# Patient Record
Sex: FEMALE | Race: WHITE | Employment: FULL TIME | URBAN - METROPOLITAN AREA
[De-identification: names, ages, dates, MRNs, and addresses within clinical notes are randomized per-mention and may not be internally consistent; named-entity substitution may affect disease eponyms.]

---

## 2024-01-30 ENCOUNTER — APPOINTMENT (OUTPATIENT)
Dept: GENERAL RADIOLOGY | Age: 39
End: 2024-01-30
Payer: COMMERCIAL

## 2024-01-30 ENCOUNTER — HOSPITAL ENCOUNTER (EMERGENCY)
Age: 39
Discharge: HOME | End: 2024-01-30
Payer: COMMERCIAL

## 2024-01-30 ENCOUNTER — OFFICE VISIT (OUTPATIENT)
Dept: ORTHOPEDIC SURGERY | Age: 39
End: 2024-01-30
Payer: COMMERCIAL

## 2024-01-30 VITALS
BODY MASS INDEX: 20.83 KG/M2 | HEIGHT: 64 IN | HEART RATE: 62 BPM | WEIGHT: 122 LBS | OXYGEN SATURATION: 100 % | TEMPERATURE: 98.1 F | DIASTOLIC BLOOD PRESSURE: 64 MMHG | SYSTOLIC BLOOD PRESSURE: 107 MMHG | RESPIRATION RATE: 18 BRPM

## 2024-01-30 DIAGNOSIS — S52.612A CLOSED DISPLACED FRACTURE OF STYLOID PROCESS OF LEFT ULNA, INITIAL ENCOUNTER: ICD-10-CM

## 2024-01-30 DIAGNOSIS — S62.102A CLOSED FRACTURE OF LEFT WRIST, INITIAL ENCOUNTER: Primary | ICD-10-CM

## 2024-01-30 DIAGNOSIS — S52.552A CLOSED EXTRAARTICULAR FRACTURE OF DISTAL RADIUS, LEFT, INITIAL ENCOUNTER: ICD-10-CM

## 2024-01-30 DIAGNOSIS — G56.02 LEFT CARPAL TUNNEL SYNDROME: ICD-10-CM

## 2024-01-30 DIAGNOSIS — S52.502A CLOSED FRACTURE OF DISTAL END OF LEFT RADIUS, UNSPECIFIED FRACTURE MORPHOLOGY, INITIAL ENCOUNTER: Primary | ICD-10-CM

## 2024-01-30 DIAGNOSIS — S52.552A OTHER EXTRAARTICULAR FRACTURE OF LOWER END OF LEFT RADIUS, INITIAL ENCOUNTER FOR CLOSED FRACTURE: ICD-10-CM

## 2024-01-30 PROCEDURE — 6360000002 HC RX W HCPCS

## 2024-01-30 PROCEDURE — 99204 OFFICE O/P NEW MOD 45 MIN: CPT | Performed by: NURSE PRACTITIONER

## 2024-01-30 PROCEDURE — 73110 X-RAY EXAM OF WRIST: CPT

## 2024-01-30 PROCEDURE — M5030 MISC ARM SLING: HCPCS | Performed by: NURSE PRACTITIONER

## 2024-01-30 PROCEDURE — 6370000000 HC RX 637 (ALT 250 FOR IP)

## 2024-01-30 PROCEDURE — 25605 CLTX DST RDL FX/EPHYS SEP W/: CPT

## 2024-01-30 PROCEDURE — 99284 EMERGENCY DEPT VISIT MOD MDM: CPT

## 2024-01-30 PROCEDURE — 96375 TX/PRO/DX INJ NEW DRUG ADDON: CPT

## 2024-01-30 PROCEDURE — 96374 THER/PROPH/DIAG INJ IV PUSH: CPT

## 2024-01-30 RX ORDER — ACETAMINOPHEN 500 MG
1000 TABLET ORAL EVERY 6 HOURS PRN
COMMUNITY

## 2024-01-30 RX ORDER — ONDANSETRON 4 MG/1
4 TABLET, FILM COATED ORAL 3 TIMES DAILY PRN
Qty: 15 TABLET | Refills: 0 | Status: SHIPPED | OUTPATIENT
Start: 2024-01-30

## 2024-01-30 RX ORDER — HYDROCODONE BITARTRATE AND ACETAMINOPHEN 5; 325 MG/1; MG/1
1 TABLET ORAL EVERY 6 HOURS PRN
Qty: 10 TABLET | Refills: 0 | Status: SHIPPED | OUTPATIENT
Start: 2024-01-30 | End: 2024-02-02

## 2024-01-30 RX ORDER — IBUPROFEN 200 MG
400 TABLET ORAL EVERY 6 HOURS PRN
COMMUNITY

## 2024-01-30 RX ORDER — ONDANSETRON 4 MG/1
4 TABLET, FILM COATED ORAL EVERY 8 HOURS PRN
Qty: 20 TABLET | Refills: 0 | Status: SHIPPED | OUTPATIENT
Start: 2024-01-30

## 2024-01-30 RX ORDER — ONDANSETRON 2 MG/ML
4 INJECTION INTRAMUSCULAR; INTRAVENOUS ONCE
Status: COMPLETED | OUTPATIENT
Start: 2024-01-30 | End: 2024-01-30

## 2024-01-30 RX ORDER — MORPHINE SULFATE 4 MG/ML
4 INJECTION, SOLUTION INTRAMUSCULAR; INTRAVENOUS
Status: COMPLETED | OUTPATIENT
Start: 2024-01-30 | End: 2024-01-30

## 2024-01-30 RX ORDER — OXYCODONE HYDROCHLORIDE 5 MG/1
5 TABLET ORAL EVERY 4 HOURS PRN
Qty: 30 TABLET | Refills: 0 | Status: SHIPPED | OUTPATIENT
Start: 2024-01-30 | End: 2024-02-04

## 2024-01-30 RX ORDER — HYDROCODONE BITARTRATE AND ACETAMINOPHEN 7.5; 325 MG/1; MG/1
1 TABLET ORAL
Status: COMPLETED | OUTPATIENT
Start: 2024-01-30 | End: 2024-01-30

## 2024-01-30 RX ADMIN — MORPHINE SULFATE 4 MG: 4 INJECTION INTRAVENOUS at 04:21

## 2024-01-30 RX ADMIN — HYDROCODONE BITARTRATE AND ACETAMINOPHEN 1 TABLET: 7.5; 325 TABLET ORAL at 03:00

## 2024-01-30 RX ADMIN — ONDANSETRON 4 MG: 2 INJECTION INTRAMUSCULAR; INTRAVENOUS at 04:21

## 2024-01-30 ASSESSMENT — PAIN - FUNCTIONAL ASSESSMENT: PAIN_FUNCTIONAL_ASSESSMENT: 0-10

## 2024-01-30 ASSESSMENT — PAIN DESCRIPTION - DESCRIPTORS: DESCRIPTORS: ACHING

## 2024-01-30 ASSESSMENT — PAIN DESCRIPTION - ORIENTATION: ORIENTATION: LEFT

## 2024-01-30 ASSESSMENT — PAIN SCALES - GENERAL: PAINLEVEL_OUTOF10: 10

## 2024-01-30 ASSESSMENT — LIFESTYLE VARIABLES
HOW OFTEN DO YOU HAVE A DRINK CONTAINING ALCOHOL: NEVER
HOW MANY STANDARD DRINKS CONTAINING ALCOHOL DO YOU HAVE ON A TYPICAL DAY: PATIENT DOES NOT DRINK

## 2024-01-30 ASSESSMENT — PAIN DESCRIPTION - LOCATION: LOCATION: WRIST

## 2024-01-30 NOTE — ED PROVIDER NOTES
understanding: patient states understanding of the procedure being performed  Patient consent: the patient's understanding of the procedure matches consent given  Relevant documents: relevant documents present and verified  Patient identity confirmed: verbally with patient  Injury location: wrist  Location details: left wrist  Injury type: fracture  Fracture type: distal radius  Pre-procedure neurovascular assessment: neurovascularly intact  Pre-procedure distal perfusion: normal  Pre-procedure neurological function: normal  Pre-procedure range of motion: reduced  Manipulation performed: yes  Reduction successful: no  X-ray confirmed reduction: yes  Immobilization: splint  Splint type: sugar tong  Supplies used: Ortho-Glass  Post-procedure neurovascular assessment: post-procedure neurovascularly intact  Post-procedure distal perfusion: normal  Post-procedure neurological function: normal  Post-procedure range of motion: normal  Patient tolerance: patient tolerated the procedure well with no immediate complications      Splint Application    Date/Time: 1/30/2024 5:35 AM    Performed by: Jenelle Thakkar APRN - CNP  Authorized by: Jenelle Thakkar APRN - CNP    Consent:     Consent obtained:  Verbal    Consent given by:  Patient    Risks discussed:  Discoloration and numbness    Alternatives discussed:  No treatment  Universal protocol:     Procedure explained and questions answered to patient or proxy's satisfaction: yes      Relevant documents present and verified: yes      Test results available: yes      Imaging studies available: yes      Patient identity confirmed:  Verbally with patient  Pre-procedure details:     Distal perfusion: distal pulses strong and brisk capillary refill    Procedure details:     Location:  Wrist    Wrist location:  L wrist    Cast type:  Short arm    Splint type:  Sugar tong    Supplies:  Fiberglass    Attestation: Splint applied and adjusted personally by me    Post-procedure

## 2024-01-30 NOTE — ED NOTES
I have reviewed discharge instructions with the patient.  The patient verbalized understanding.    Patient left ED via Discharge Method: ambulatory to Home with    Opportunity for questions and clarification provided.       Patient given 2 scripts.         To continue your aftercare when you leave the hospital, you may receive an automated call from our care team to check in on how you are doing.  This is a free service and part of our promise to provide the best care and service to meet your aftercare needs.” If you have questions, or wish to unsubscribe from this service please call [phone].  Thank you for Choosing our [facility] Emergency Department.       Bebo Perkins, RN  01/30/24 7774

## 2024-01-30 NOTE — ED TRIAGE NOTES
Pt ambulatory to triage c/o fall that resulted in left wrist injury. Pt slipped and fell on concrete floor, landed on left wrist. Noted swelling to left wrist and possible deformity. Pt took 1000  mg tylenol and 400 mg ibuprofen prior to arrival

## 2024-01-30 NOTE — DISCHARGE INSTRUCTIONS
Follow up with orthopedics as listed on this document. Use norco as needed for severe pain, tylenol or ibuprofen as needed for less severe pain. Return for any worsening symptoms or concerns.

## 2024-01-30 NOTE — PROGRESS NOTES
Orthopaedic Hand Clinic Note    Name: Anay Solis  Age: 38 y.o.  YOB: 1985  Gender: female  MRN: [id number]      CC: Patient referred for evaluation of left wrist/hand pain    HPI: Anay Solis is a 38 y.o. female right handed with a chief complaint of   left  wrist pain. The injury occurred this morning when the patient fell when she tripped over her shoe. The pain is located diffusely about the left wrist.  Patient reports numbness and paresthesia to the thumb, index, middle.  Evaluation at the ER has included x-rays. Treatment to date has included reduction and sugar-tong splint sling. Denies loss of consciousness, head injury or neck pain.  Patient works as a ballroom dancer and instructor.    ROS/Meds/PSH/PMH/FH/SH: I personally reviewed the patients standard intake form.  Pertinents are discussed in the HPI    Physical Examination:  General: Awake and alert.  HEENT: Normocephalic, atraumatic  CV/Pulm: Breathing even and unlabored  Skin: No obvious rashes noted.  Lymphatic: No obvious evidence of lymphedema or lymphadenopathy    Musculoskeletal Exam:  Examination of the left upper extremity demonstrates no open wounds. cap refill in all fingers < 5 seconds. Finger motion is limited with  moderate  swelling of the hand and fingers. Tenderness over the distal radius.  Positive  tenderness over the ulnar aspect of the wrist. No tenderness at elbow, shoulder, clavicle or cervical spine.  Patient does have paresthesia of the thumb, index, middle fingers.    Her Ace wrap was removed and splint loosened.  The patient sat in the office for approximately 15 to 20 minutes.  She reports improvement in the paresthesia.    Imaging / Electrodiagnostic Tests:   X-trays include a 3 view left wrist are independently reviewed and interpreted.  Patient has a displaced extra-articular distal radius fracture.  Patient also has a displaced ulnar styloid fracture.    Assessment:   1. Closed fracture of left wrist,

## 2024-01-30 NOTE — PERIOP NOTE
Patient verified name and .  Order for consent NOT found in EHR at time of PAT visit. Unable to verify case posting against order; surgery verified by patient.    Type 1B surgery, phone assessment complete.  Orders not received.  Labs per surgeon: none ordered  Labs per anesthesia protocol: not indicated    Patient answered medical/surgical history questions at their best of ability. All prior to admission medications documented in EPIC.  Patient stated she was instructed to take the oxycodone and not the hydrocodone-acetaminophen.     Patient instructed to take the following medications the day of surgery according to anesthesia guidelines with a small sip of water: oxycodone if needed,  zofran if needed.  On the day before surgery please take 2 Tylenol in the morning and then again before bed. You may use either regular or extra strength.   Hold all vitamins, supplements and NSAIDS now for  surgery. Prescription meds to hold:  none    Patient instructed on the following:    > Arrive at [facility] Entrance, time of arrival to be called the day before by 1700  > NPO after midnight, unless otherwise indicated, including gum, mints, and ice chips  > Responsible adult must drive patient to the hospital, stay during surgery, and patient will need supervision 24 hours after anesthesia  > Use non moisturizing soap in shower the night before surgery and on the morning of surgery  > All piercings must be removed prior to arrival.    > Leave all valuables (money and jewelry) at home but bring insurance card and ID on DOS.   > You may be required to pay a deductible or co-pay on the day of your procedure. You can pre-pay by calling [phone] if your surgery is at the [facility] or [phone] if your surgery is at the [facility].  > Do not wear make-up, nail polish, lotions, cologne, perfumes, powders, or oil on skin. Artificial nails are not permitted.

## 2024-01-30 NOTE — H&P (VIEW-ONLY)
Orthopaedic Hand Clinic Note    Name: Anay Solis  Age: 38 y.o.  YOB: 1985  Gender: female  MRN: [id number]      CC: Patient referred for evaluation of left wrist/hand pain    HPI: Anay Solis is a 38 y.o. female right handed with a chief complaint of   left  wrist pain. The injury occurred this morning when the patient fell when she tripped over her shoe. The pain is located diffusely about the left wrist.  Patient reports numbness and paresthesia to the thumb, index, middle.  Evaluation at the ER has included x-rays. Treatment to date has included reduction and sugar-tong splint sling. Denies loss of consciousness, head injury or neck pain.  Patient works as a ballroom dancer and instructor.    ROS/Meds/PSH/PMH/FH/SH: I personally reviewed the patients standard intake form.  Pertinents are discussed in the HPI    Physical Examination:  General: Awake and alert.  HEENT: Normocephalic, atraumatic  CV/Pulm: Breathing even and unlabored  Skin: No obvious rashes noted.  Lymphatic: No obvious evidence of lymphedema or lymphadenopathy    Musculoskeletal Exam:  Examination of the left upper extremity demonstrates no open wounds. cap refill in all fingers < 5 seconds. Finger motion is limited with  moderate  swelling of the hand and fingers. Tenderness over the distal radius.  Positive  tenderness over the ulnar aspect of the wrist. No tenderness at elbow, shoulder, clavicle or cervical spine.  Patient does have paresthesia of the thumb, index, middle fingers.    Her Ace wrap was removed and splint loosened.  The patient sat in the office for approximately 15 to 20 minutes.  She reports improvement in the paresthesia.    Imaging / Electrodiagnostic Tests:   X-trays include a 3 view left wrist are independently reviewed and interpreted.  Patient has a displaced extra-articular distal radius fracture.  Patient also has a displaced ulnar styloid fracture.    Assessment:   1. Closed fracture of left wrist, 
Other:

## 2024-01-31 ENCOUNTER — TELEPHONE (OUTPATIENT)
Dept: ORTHOPEDIC SURGERY | Age: 39
End: 2024-01-31

## 2024-01-31 ENCOUNTER — ANESTHESIA EVENT (OUTPATIENT)
Dept: SURGERY | Age: 39
End: 2024-01-31
Payer: COMMERCIAL

## 2024-01-31 NOTE — TELEPHONE ENCOUNTER
She had numbness in her fingers yesterday but that got better. She is having pain in her thumb. She has looked at the xray and feels like there is a piece of bone or something at the base of her thumb. She wants Lou to look at her xray. She is having wrist surgery tomorrow. Please give her a call.

## 2024-01-31 NOTE — TELEPHONE ENCOUNTER
I returned patient call- Dr. Diaz and Lou Brennan Np have reviewed xrays from 1/30/2024. No thumb fractures are seen. She understands to call back with any other questions or concerns.

## 2024-01-31 NOTE — PERIOP NOTE
Preop department called to notify patient of arrival time for scheduled procedure. Instructions given to   - Arrive at [facility] Entrance [address].  - Remain NPO after midnight, unless otherwise indicated, including gum, mints, and ice chips.   - Have a responsible adult to drive patient to the hospital, stay during surgery, and patient will need supervision 24 hours after anesthesia.   - Use antibacterial soap in shower the night before surgery and on the morning of surgery.       Was patient contacted: YES  Voicemail left:   Numbers contacted: [phone]   Arrival time: 0800

## 2024-01-31 NOTE — PROGRESS NOTES
Patient was fitted and instructed on the use of Arm Sling for the left shoulder. Patient is aware the arm should fit comfortably in the sling with the elbow as far back as possible. I explained the thumb should be placed in the thumb loop to help prevent the arm from sliding out of the sling. Patient was instructed that the shoulder strap should cross over the opposite shoulder continuing threw the loop attached to the sling and then velcro back on the shoulder strap.     Patient read and signed documenting they understand and agree to [facility]'s current DME return policy.

## 2024-01-31 NOTE — DISCHARGE INSTRUCTIONS
Postoperative  Instructions:      Weightbearing or Lifting:  You  are  not  allowed  to  lift  any  weight  or  bear  any  weight  on  the  surgical  extremity  until  cleared  by  your  surgeon.      Dressing  instructions:    Keep  your  dressing  and/or  splint  clean  and  dry  at  all  times.    It  will  be  removed  at  your  first  post-operative  appointment or therapy apppointment.    Your  stitches  will  be  removed  at  this  visit.      Showering  Instructions:  May  shower  But keep surgical dressing clean and dry until removed as explained above.      Pain  Control:  - You  have  been  given  a  prescription  to  be  taken  as  directed  for  post-operative  pain  control.    In  addition,  elevate  the  operative  extremity  above  the  heart  at  all  times  to  prevent  swelling  and  throbbing  pain.   - If you develop constipation while taking narcotic pain medications (Norco, Hydrocodone, Percocet, Oxycodone, Dilaudid, Hydromorphone) take  over-the-counter  Colace,  100mg  by  mouth  twice  a  Day.     - Nausea  is  a  common  side  effect  of  many  pain  medications.  You  will  want  to  eat something  before  taking  your  pain  medicine  to  help  prevent  Nausea.  - If  you  are  taking  a  prescription  pain  medication  that  contains  acetaminophen,  we  recommend  that  you  do  not  take  additional  over  the  counter  acetaminophen  (Tylenol®).      Other  pain  relieving  options:   - Using  a  cold  pack  to  ice  the  affected  area  a  few  times  a  day  (15  to  20  minutes  at  a  time)  can  help  to  relieve  pain,  reduce  swelling  and  bruising.      - Elevation  of  the  affected  area  can  also  help  to  reduce  pain  and  swelling.      Did  you  receive  a  nerve  Block?  A  nerve  block  can  provide  pain  relief  for  one  hour  to  two  days  after  your  surgery.  As  long  as  the  nerve  block  is  working,  you  will  experience  little  or  no  sensation   in  the  area  the  surgeon  operated  on.        As  the  nerve  block  wears  off,  you  will  begin  to  experience  pain  or  discomfort.  It  is  very  important  that  you  begin  taking  your  prescribed  pain  medication  before  the  nerve  block  fully  wears  off. The first sign that the nerve block is wearing off is tingling in your fingers. Treating  your  pain  at  the  first  sign  of  the  block  wearing  off  will  ensure  your  pain  is  better  controlled  and  more  tolerable  when  full-sensation  returns.  Do  not  wait  until  the  pain  is  intolerable,  as  the  medicine  will  be  less  effective.  It  is  better  to  treat  pain  in  advance  than  to  try  and  catch  up.        General  Anesthesia or Sedation:      If  you  did  not  receive  a  nerve  block  during  your  surgery,  you  will  need  to  start  taking  your  pain  medication  shortly  after  your  surgery  and  should  continue  to  do  so  as  prescribed  by  your  surgeon.       Please contact us through my chart or call  [phone]  with any concern and ask to speak with Dr Diaz team.    Concerning problems include:      -  Excessive  redness  of  the  incisions      -  Drainage  for  more  than  2  Days after surgery or any foul smelling drainage  -  Fever  of  more  than  101.5  F      Please  call  [phone]  if  you  do  not  receive  or  are  unsure  of  your  first  follow-up  appointment.    You  should  see  the  doctor  10-14  days  after  your  Surgery.    Thank you for choosing me and [facility] for your care. I will go above and beyond to ensure you receive the best care possible.    Bal Loera Jr, MD, PhD

## 2024-02-01 ENCOUNTER — HOSPITAL ENCOUNTER (OUTPATIENT)
Age: 39
Setting detail: OUTPATIENT SURGERY
Discharge: HOME | End: 2024-02-01
Attending: ORTHOPAEDIC SURGERY | Admitting: ORTHOPAEDIC SURGERY
Payer: COMMERCIAL

## 2024-02-01 ENCOUNTER — ANESTHESIA (OUTPATIENT)
Dept: SURGERY | Age: 39
End: 2024-02-01
Payer: COMMERCIAL

## 2024-02-01 ENCOUNTER — APPOINTMENT (OUTPATIENT)
Dept: GENERAL RADIOLOGY | Age: 39
End: 2024-02-01
Attending: ORTHOPAEDIC SURGERY
Payer: COMMERCIAL

## 2024-02-01 VITALS
BODY MASS INDEX: 20.83 KG/M2 | WEIGHT: 122 LBS | SYSTOLIC BLOOD PRESSURE: 123 MMHG | TEMPERATURE: 98.7 F | RESPIRATION RATE: 12 BRPM | HEIGHT: 64 IN | DIASTOLIC BLOOD PRESSURE: 72 MMHG | OXYGEN SATURATION: 96 % | HEART RATE: 61 BPM

## 2024-02-01 PROCEDURE — 25607 OPTX DST RD XARTC FX/EPI SEP: CPT | Performed by: ORTHOPAEDIC SURGERY

## 2024-02-01 PROCEDURE — 2720000010 HC SURG SUPPLY STERILE: Performed by: ORTHOPAEDIC SURGERY

## 2024-02-01 PROCEDURE — 2580000003 HC RX 258: Performed by: NURSE ANESTHETIST, CERTIFIED REGISTERED

## 2024-02-01 PROCEDURE — 3700000000 HC ANESTHESIA ATTENDED CARE: Performed by: ORTHOPAEDIC SURGERY

## 2024-02-01 PROCEDURE — 6360000002 HC RX W HCPCS: Performed by: STUDENT IN AN ORGANIZED HEALTH CARE EDUCATION/TRAINING PROGRAM

## 2024-02-01 PROCEDURE — 2580000003 HC RX 258: Performed by: STUDENT IN AN ORGANIZED HEALTH CARE EDUCATION/TRAINING PROGRAM

## 2024-02-01 PROCEDURE — 2709999900 HC NON-CHARGEABLE SUPPLY: Performed by: ORTHOPAEDIC SURGERY

## 2024-02-01 PROCEDURE — 3700000001 HC ADD 15 MINUTES (ANESTHESIA): Performed by: ORTHOPAEDIC SURGERY

## 2024-02-01 PROCEDURE — 3600000014 HC SURGERY LEVEL 4 ADDTL 15MIN: Performed by: ORTHOPAEDIC SURGERY

## 2024-02-01 PROCEDURE — 64721 CARPAL TUNNEL SURGERY: CPT | Performed by: ORTHOPAEDIC SURGERY

## 2024-02-01 PROCEDURE — C1713 ANCHOR/SCREW BN/BN,TIS/BN: HCPCS | Performed by: ORTHOPAEDIC SURGERY

## 2024-02-01 PROCEDURE — 6360000002 HC RX W HCPCS: Performed by: NURSE ANESTHETIST, CERTIFIED REGISTERED

## 2024-02-01 PROCEDURE — 6360000002 HC RX W HCPCS: Performed by: NURSE PRACTITIONER

## 2024-02-01 PROCEDURE — 2500000003 HC RX 250 WO HCPCS: Performed by: NURSE ANESTHETIST, CERTIFIED REGISTERED

## 2024-02-01 PROCEDURE — 7100000001 HC PACU RECOVERY - ADDTL 15 MIN: Performed by: ORTHOPAEDIC SURGERY

## 2024-02-01 PROCEDURE — 7100000000 HC PACU RECOVERY - FIRST 15 MIN: Performed by: ORTHOPAEDIC SURGERY

## 2024-02-01 PROCEDURE — 64415 NJX AA&/STRD BRCH PLXS IMG: CPT | Performed by: STUDENT IN AN ORGANIZED HEALTH CARE EDUCATION/TRAINING PROGRAM

## 2024-02-01 PROCEDURE — 7100000010 HC PHASE II RECOVERY - FIRST 15 MIN: Performed by: ORTHOPAEDIC SURGERY

## 2024-02-01 PROCEDURE — 7100000011 HC PHASE II RECOVERY - ADDTL 15 MIN: Performed by: ORTHOPAEDIC SURGERY

## 2024-02-01 PROCEDURE — 3600000004 HC SURGERY LEVEL 4 BASE: Performed by: ORTHOPAEDIC SURGERY

## 2024-02-01 DEVICE — SCREW BNE L22MM DIA2.4MM CORT S STL ST T8 STARDRV RECESS: Type: IMPLANTABLE DEVICE | Site: WRIST | Status: FUNCTIONAL

## 2024-02-01 DEVICE — SCREW BNE L14MM DIA2.4MM DST RAD VOLAR S STL ST VAR ANG LOK: Type: IMPLANTABLE DEVICE | Site: WRIST | Status: FUNCTIONAL

## 2024-02-01 DEVICE — SCREW BNE L16MM DIA2.4MM DST RAD VOLAR S STL ST VAR ANG LOK: Type: IMPLANTABLE DEVICE | Site: WRIST | Status: FUNCTIONAL

## 2024-02-01 DEVICE — SCREW BNE L18MM DIA2.4MM DST RAD VOLAR S STL ST VAR ANG LOK: Type: IMPLANTABLE DEVICE | Site: WRIST | Status: FUNCTIONAL

## 2024-02-01 DEVICE — SCREW BNE L14MM DIA2.4MM CORT S STL ST T8 STARDRV RECESS: Type: IMPLANTABLE DEVICE | Site: WRIST | Status: FUNCTIONAL

## 2024-02-01 DEVICE — PLATE BNE L51MM 6X3 H L VOLAR DST RAD S STL VAR ANG LOK: Type: IMPLANTABLE DEVICE | Site: WRIST | Status: FUNCTIONAL

## 2024-02-01 DEVICE — SCREW BNE L13MM DIA2.4MM CORT S STL ST T8 STARDRV RECESS: Type: IMPLANTABLE DEVICE | Site: WRIST | Status: FUNCTIONAL

## 2024-02-01 RX ORDER — PROPOFOL 10 MG/ML
INJECTION, EMULSION INTRAVENOUS PRN
Status: DISCONTINUED | OUTPATIENT
Start: 2024-02-01 | End: 2024-02-01 | Stop reason: SDUPTHER

## 2024-02-01 RX ORDER — SODIUM CHLORIDE, SODIUM LACTATE, POTASSIUM CHLORIDE, CALCIUM CHLORIDE 600; 310; 30; 20 MG/100ML; MG/100ML; MG/100ML; MG/100ML
INJECTION, SOLUTION INTRAVENOUS CONTINUOUS
Status: DISCONTINUED | OUTPATIENT
Start: 2024-02-01 | End: 2024-02-01 | Stop reason: HOSPADM

## 2024-02-01 RX ORDER — HYDRALAZINE HYDROCHLORIDE 20 MG/ML
10 INJECTION INTRAMUSCULAR; INTRAVENOUS
Status: DISCONTINUED | OUTPATIENT
Start: 2024-02-01 | End: 2024-02-01 | Stop reason: HOSPADM

## 2024-02-01 RX ORDER — IPRATROPIUM BROMIDE AND ALBUTEROL SULFATE 2.5; .5 MG/3ML; MG/3ML
1 SOLUTION RESPIRATORY (INHALATION)
Status: DISCONTINUED | OUTPATIENT
Start: 2024-02-01 | End: 2024-02-01 | Stop reason: HOSPADM

## 2024-02-01 RX ORDER — SODIUM CHLORIDE, SODIUM LACTATE, POTASSIUM CHLORIDE, CALCIUM CHLORIDE 600; 310; 30; 20 MG/100ML; MG/100ML; MG/100ML; MG/100ML
INJECTION, SOLUTION INTRAVENOUS CONTINUOUS PRN
Status: DISCONTINUED | OUTPATIENT
Start: 2024-02-01 | End: 2024-02-01 | Stop reason: SDUPTHER

## 2024-02-01 RX ORDER — SODIUM CHLORIDE 0.9 % (FLUSH) 0.9 %
5-40 SYRINGE (ML) INJECTION EVERY 12 HOURS SCHEDULED
Status: DISCONTINUED | OUTPATIENT
Start: 2024-02-01 | End: 2024-02-01 | Stop reason: HOSPADM

## 2024-02-01 RX ORDER — PROCHLORPERAZINE EDISYLATE 5 MG/ML
5 INJECTION INTRAMUSCULAR; INTRAVENOUS
Status: DISCONTINUED | OUTPATIENT
Start: 2024-02-01 | End: 2024-02-01 | Stop reason: HOSPADM

## 2024-02-01 RX ORDER — SODIUM CHLORIDE 0.9 % (FLUSH) 0.9 %
5-40 SYRINGE (ML) INJECTION PRN
Status: DISCONTINUED | OUTPATIENT
Start: 2024-02-01 | End: 2024-02-01 | Stop reason: HOSPADM

## 2024-02-01 RX ORDER — OXYCODONE HYDROCHLORIDE 5 MG/1
10 TABLET ORAL PRN
Status: DISCONTINUED | OUTPATIENT
Start: 2024-02-01 | End: 2024-02-01 | Stop reason: HOSPADM

## 2024-02-01 RX ORDER — DIPHENHYDRAMINE HYDROCHLORIDE 50 MG/ML
12.5 INJECTION INTRAMUSCULAR; INTRAVENOUS
Status: DISCONTINUED | OUTPATIENT
Start: 2024-02-01 | End: 2024-02-01 | Stop reason: HOSPADM

## 2024-02-01 RX ORDER — DEXAMETHASONE SODIUM PHOSPHATE 4 MG/ML
INJECTION, SOLUTION INTRA-ARTICULAR; INTRALESIONAL; INTRAMUSCULAR; INTRAVENOUS; SOFT TISSUE
Status: DISCONTINUED | OUTPATIENT
Start: 2024-02-01 | End: 2024-02-01 | Stop reason: SDUPTHER

## 2024-02-01 RX ORDER — OXYCODONE HYDROCHLORIDE 5 MG/1
5 TABLET ORAL PRN
Status: DISCONTINUED | OUTPATIENT
Start: 2024-02-01 | End: 2024-02-01 | Stop reason: HOSPADM

## 2024-02-01 RX ORDER — FENTANYL CITRATE 50 UG/ML
100 INJECTION, SOLUTION INTRAMUSCULAR; INTRAVENOUS
Status: COMPLETED | OUTPATIENT
Start: 2024-02-01 | End: 2024-02-01

## 2024-02-01 RX ORDER — LIDOCAINE HYDROCHLORIDE 20 MG/ML
INJECTION, SOLUTION EPIDURAL; INFILTRATION; INTRACAUDAL; PERINEURAL PRN
Status: DISCONTINUED | OUTPATIENT
Start: 2024-02-01 | End: 2024-02-01 | Stop reason: SDUPTHER

## 2024-02-01 RX ORDER — SODIUM CHLORIDE 9 MG/ML
INJECTION, SOLUTION INTRAVENOUS PRN
Status: DISCONTINUED | OUTPATIENT
Start: 2024-02-01 | End: 2024-02-01 | Stop reason: HOSPADM

## 2024-02-01 RX ORDER — EPHEDRINE SULFATE/0.9% NACL/PF 50 MG/5 ML
SYRINGE (ML) INTRAVENOUS PRN
Status: DISCONTINUED | OUTPATIENT
Start: 2024-02-01 | End: 2024-02-01 | Stop reason: SDUPTHER

## 2024-02-01 RX ORDER — FENTANYL CITRATE 50 UG/ML
25 INJECTION, SOLUTION INTRAMUSCULAR; INTRAVENOUS
Status: COMPLETED | OUTPATIENT
Start: 2024-02-01 | End: 2024-02-01

## 2024-02-01 RX ORDER — MIDAZOLAM HYDROCHLORIDE 2 MG/2ML
2 INJECTION, SOLUTION INTRAMUSCULAR; INTRAVENOUS
Status: COMPLETED | OUTPATIENT
Start: 2024-02-01 | End: 2024-02-01

## 2024-02-01 RX ORDER — ROPIVACAINE HYDROCHLORIDE 5 MG/ML
INJECTION, SOLUTION EPIDURAL; INFILTRATION; PERINEURAL
Status: DISCONTINUED | OUTPATIENT
Start: 2024-02-01 | End: 2024-02-01 | Stop reason: SDUPTHER

## 2024-02-01 RX ORDER — LIDOCAINE HYDROCHLORIDE 10 MG/ML
1 INJECTION, SOLUTION INFILTRATION; PERINEURAL
Status: DISCONTINUED | OUTPATIENT
Start: 2024-02-01 | End: 2024-02-01 | Stop reason: HOSPADM

## 2024-02-01 RX ORDER — HALOPERIDOL 5 MG/ML
1 INJECTION INTRAMUSCULAR
Status: DISCONTINUED | OUTPATIENT
Start: 2024-02-01 | End: 2024-02-01 | Stop reason: HOSPADM

## 2024-02-01 RX ORDER — LABETALOL HYDROCHLORIDE 5 MG/ML
10 INJECTION, SOLUTION INTRAVENOUS
Status: DISCONTINUED | OUTPATIENT
Start: 2024-02-01 | End: 2024-02-01 | Stop reason: HOSPADM

## 2024-02-01 RX ORDER — HYDROMORPHONE HYDROCHLORIDE 2 MG/ML
0.5 INJECTION, SOLUTION INTRAMUSCULAR; INTRAVENOUS; SUBCUTANEOUS EVERY 5 MIN PRN
Status: DISCONTINUED | OUTPATIENT
Start: 2024-02-01 | End: 2024-02-01 | Stop reason: HOSPADM

## 2024-02-01 RX ADMIN — ROPIVACAINE HYDROCHLORIDE 20 ML: 5 INJECTION, SOLUTION EPIDURAL; INFILTRATION; PERINEURAL at 08:54

## 2024-02-01 RX ADMIN — SODIUM CHLORIDE, SODIUM LACTATE, POTASSIUM CHLORIDE, AND CALCIUM CHLORIDE: 600; 310; 30; 20 INJECTION, SOLUTION INTRAVENOUS at 09:43

## 2024-02-01 RX ADMIN — MEPIVACAINE HYDROCHLORIDE 10 ML: 15 INJECTION, SOLUTION EPIDURAL; INFILTRATION at 08:54

## 2024-02-01 RX ADMIN — DEXAMETHASONE SODIUM PHOSPHATE 4 MG: 4 INJECTION, SOLUTION INTRAMUSCULAR; INTRAVENOUS at 08:54

## 2024-02-01 RX ADMIN — PROPOFOL 50 MG: 10 INJECTION, EMULSION INTRAVENOUS at 09:47

## 2024-02-01 RX ADMIN — FENTANYL CITRATE 100 MCG: 50 INJECTION, SOLUTION INTRAMUSCULAR; INTRAVENOUS at 08:55

## 2024-02-01 RX ADMIN — Medication 10 MG: at 10:04

## 2024-02-01 RX ADMIN — PROPOFOL 100 MG: 10 INJECTION, EMULSION INTRAVENOUS at 09:48

## 2024-02-01 RX ADMIN — SODIUM CHLORIDE, SODIUM LACTATE, POTASSIUM CHLORIDE, AND CALCIUM CHLORIDE: 600; 310; 30; 20 INJECTION, SOLUTION INTRAVENOUS at 10:25

## 2024-02-01 RX ADMIN — LIDOCAINE HYDROCHLORIDE 100 MG: 20 INJECTION, SOLUTION EPIDURAL; INFILTRATION; INTRACAUDAL; PERINEURAL at 09:47

## 2024-02-01 RX ADMIN — SODIUM CHLORIDE, POTASSIUM CHLORIDE, SODIUM LACTATE AND CALCIUM CHLORIDE: 600; 310; 30; 20 INJECTION, SOLUTION INTRAVENOUS at 08:45

## 2024-02-01 RX ADMIN — PROPOFOL 50 MG: 10 INJECTION, EMULSION INTRAVENOUS at 09:53

## 2024-02-01 RX ADMIN — PROPOFOL 140 MG: 10 INJECTION, EMULSION INTRAVENOUS at 09:54

## 2024-02-01 RX ADMIN — Medication 2000 MG: at 09:47

## 2024-02-01 RX ADMIN — MIDAZOLAM 2 MG: 1 INJECTION INTRAMUSCULAR; INTRAVENOUS at 08:54

## 2024-02-01 ASSESSMENT — PAIN DESCRIPTION - DESCRIPTORS: DESCRIPTORS: ACHING

## 2024-02-01 ASSESSMENT — PAIN - FUNCTIONAL ASSESSMENT
PAIN_FUNCTIONAL_ASSESSMENT: 0-10
PAIN_FUNCTIONAL_ASSESSMENT: PREVENTS OR INTERFERES SOME ACTIVE ACTIVITIES AND ADLS

## 2024-02-01 NOTE — PROGRESS NOTES
Spiritual Consult for Pre-Surgery Prayer. PT was in bed preparing for surgery. PT shared about surgery including hopes and concerns. CH offered comforting spiritual presence, active listening, and therapeutic communication. PT expressed importance of stefan and prayer. PT is Rastafarian. CH offered prayer. CH met PT's boyfriend. CH offered additional support if needed.    Rev. Harmony Christian M.Div.

## 2024-02-01 NOTE — INTERVAL H&P NOTE
H&P Update:  Anay Solis was seen and examined.  History and physical has been reviewed. The patient has been examined. There have been no significant clinical changes since the completion of the originally dated History and Physical.    Bal Diaz MD  Orthopaedic Surgery  02/01/24  8:09 AM

## 2024-02-01 NOTE — ANESTHESIA POSTPROCEDURE EVALUATION
Department of Anesthesiology  Postprocedure Note    Patient: Anay Solis  MRN: [id number]  YOB: 1985  Date of evaluation: 2/1/2024    Procedure Summary       Date: 02/01/24 Room / Location: [facility] OP OR 07 / [facility]    Anesthesia Start: 0943 Anesthesia Stop: 1044    Procedures:       OPEN REDUCTION INTERNAL FIXATION LEFT DISTAL RADIUS FRACTURE (Left: Wrist)      LEFT CARPAL TUNNEL RELEASE (Left: Wrist) Diagnosis:       Closed displaced fracture of styloid process of left ulna      Closed extraarticular fracture of distal radius, left, initial encounter      Left carpal tunnel syndrome      (Closed displaced fracture of styloid process of left ulna [S52.612A])      (Closed extraarticular fracture of distal radius, left, initial encounter [S52.552A])      (Left carpal tunnel syndrome [G56.02])    Surgeons: Bal Diaz MD Responsible Provider: Emilio Almeida MD    Anesthesia Type: MAC ASA Status: 2            Anesthesia Type: MAC    Dedra Phase I: Dedra Score: 10    Dedra Phase II: Dedra Score: 10    Anesthesia Post Evaluation    Patient location during evaluation: bedside  Patient participation: complete - patient participated  Level of consciousness: awake and alert  Airway patency: patent  Nausea & Vomiting: no vomiting  Cardiovascular status: hemodynamically stable  Respiratory status: acceptable  Hydration status: euvolemic  Pain management: adequate    No notable events documented.

## 2024-02-01 NOTE — ANESTHESIA PRE PROCEDURE
Department of Anesthesiology  Preprocedure Note       Name:  Anay Solis   Age:  38 y.o.  :  1985                                          MRN:  [id number]         Date:  2024      Surgeon: Surgeon(s):  Bal Diaz MD    Procedure: Procedure(s):  OPEN REDUCTION INTERNAL FIXATION LEFT DISTAL RADIUS FRACTURE  LEFT CARPAL TUNNEL RELEASE    Medications prior to admission:   Prior to Admission medications    Medication Sig Start Date End Date Taking? Authorizing Provider   acetaminophen (TYLENOL) 500 MG tablet Take 2 tablets by mouth every 6 hours as needed for Pain   Yes Provider, MD Jatinder   ibuprofen (ADVIL;MOTRIN) 200 MG tablet Take 2 tablets by mouth every 6 hours as needed for Pain   Yes ProviderJatinder MD   vitamin D (CHOLECALCIFEROL) 48365 UNIT CAPS Take 1 capsule by mouth every 7 days 2/1/24 3/14/24  Lou Brennan APRN - CNP   HYDROcodone-acetaminophen (NORCO) 5-325 MG per tablet Take 1 tablet by mouth every 6 hours as needed for Pain for up to 3 days. Intended supply: 3 days. Take lowest dose possible to manage pain Max Daily Amount: 4 tablets  Patient not taking: Reported on 2024  Jenelle Thakkar APRN - CNP   ondansetron (ZOFRAN) 4 MG tablet Take 1 tablet by mouth 3 times daily as needed for Nausea or Vomiting 24   Jenelle Thakkar APRN - CNP   oxyCODONE (ROXICODONE) 5 MG immediate release tablet Take 1 tablet by mouth every 4 hours as needed for Pain for up to 5 days. Max Daily Amount: 30 mg 24  Lou Brennan APRN - CNP   ondansetron (ZOFRAN) 4 MG tablet Take 1 tablet by mouth every 8 hours as needed for Nausea or Vomiting 24   Lou Brennan APRN - CNP       Current medications:    Current Facility-Administered Medications   Medication Dose Route Frequency Provider Last Rate Last Admin    HYDROmorphone HCl PF (DILAUDID) injection 0.5 mg  0.5 mg IntraVENous Q5 Min PRN Emilio Almeida MD        oxyCODONE (ROXICODONE) immediate

## 2024-02-01 NOTE — ANESTHESIA PROCEDURE NOTES
Peripheral Block    Patient location during procedure: pre-op  Reason for block: post-op pain management and at surgeon's request  Start time: 2/1/2024 8:54 AM  End time: 2/1/2024 9:00 AM  Staffing  Performed: anesthesiologist   Anesthesiologist: Emilio Almeida MD  Performed by: Emilio Almeida MD  Authorized by: Emilio Almeida MD    Preanesthetic Checklist  Completed: patient identified, IV checked, site marked, risks and benefits discussed, surgical/procedural consents, equipment checked, pre-op evaluation, timeout performed, anesthesia consent given, oxygen available, monitors applied/VS acknowledged, fire risk safety assessment completed and verbalized and blood product R/B/A discussed and consented  Peripheral Block   Patient position: supine  Prep: ChloraPrep  Provider prep: mask  Block type: Brachial plexus  Supraclavicular  Laterality: left  Injection technique: single-shot  Guidance: ultrasound guided    Needle   Needle type: insulated echogenic nerve stimulator needle   Needle gauge: 20 G  Needle localization: ultrasound guidance  Assessment   Injection assessment: negative aspiration for heme, no paresthesia on injection, local visualized surrounding nerve on ultrasound and no intravascular symptoms  Paresthesia pain: none  Slow fractionated injection: yes  Hemodynamics: stable  Real-time US image taken/store: yes  Outcomes: patient tolerated procedure well and uncomplicated    Additional Notes  Ultrasound image taken/on chart.  Medications Administered  dexAMETHasone (DECADRON) injection 4 mg/mL - Perineural   4 mg - 2/1/2024 8:54:00 AM  mepivacaine (CARBOCAINE) injection 1.5% - Perineural   10 mL - 2/1/2024 8:54:00 AM  ropivacaine (NAROPIN) injection 0.5% - Perineural   20 mL - 2/1/2024 8:54:00 AM

## 2024-02-02 ENCOUNTER — TELEPHONE (OUTPATIENT)
Dept: ORTHOPEDIC SURGERY | Age: 39
End: 2024-02-02

## 2024-02-02 NOTE — TELEPHONE ENCOUNTER
I returned patient call and answered all questions. She understands to call back with any other questions or concerns.

## 2024-02-02 NOTE — OP NOTE
OPERATIVE NOTE    Anay Solis     female  [id number]  2/1/2024    PRE-OP DIAGNOSIS: Pre-Op Diagnosis Codes:     * Closed displaced fracture of styloid process of left ulna [S52.612A]     * Closed extraarticular fracture of distal radius, left, initial encounter [S52.552A]     * Left carpal tunnel syndrome [G56.02]       POST-OP DIAGNOSIS: Same    LATERALITY: Left  Left    PROCEDURES PERFORMED:  Open treatment of Left  Left two part extra-articular distal radius fracture with internal fixation, left open carpal tunnel release    SURGEON:  Bal Diaz MD    IMPLANTS:  Implant Name Type Inv. Item Serial No.  Lot No. LRB No. Used Action   PLATE BNE L51MM 6X3 H L VOLAR DST RAD S STL JAJA ANG EULALIA - [id number]  PLATE BNE L51MM 6X3 H L VOLAR DST RAD S STL JAJA ANG EULALIA  [employer] [id number] Left 1 Implanted   SCREW BNE L18MM DIA2.4MM DST RAD VOLAR S STL ST JAJA ANG EULALIA - [id number]  SCREW BNE L18MM DIA2.4MM DST RAD VOLAR S STL ST JAJA ANG EULALIA  [employer] [id number] Left 1 Implanted   SCREW BNE L16MM DIA2.4MM DST RAD VOLAR S STL ST JAJA ANG EULALIA - [id number]  SCREW BNE L16MM DIA2.4MM DST RAD VOLAR S STL ST JAJA ANG EULALIA  [employer] [id number] Left 2 Implanted   SCREW BNE L14MM DIA2.4MM DST RAD VOLAR S STL ST JAJA ANG EULALIA - [id number]  SCREW BNE L14MM DIA2.4MM DST RAD VOLAR S STL ST JAJA ANG EULALIA  [employer] [id number] Left 1 Implanted   SCREW BNE L22MM DIA2.4MM FABIENNE S STL ST T8 STARDRV RECESS - [id number]  SCREW BNE L22MM DIA2.4MM FABIENNE S STL ST T8 STARDRV RECESS  DEPUY SY[employer] [facility] [id number] Left 1 Implanted   SCREW BNE L14MM DIA2.4MM FABIENNE S STL ST T8 STARDRV RECESS - [id number]  SCREW BNE L14MM DIA2.4MM FABIENNE S STL ST T8 STARDRV RECESS  DEPU[employer] [facility] [id number] Left 1 Implanted   SCREW BNE L13MM DIA2.4MM FABIENNE S STL ST T8 STARDRV RECESS - [id number]  SCREW BNE L13MM DIA2.4MM FABIENNE S STL ST T8 STARDRV RECESS  DEPUY [employer] [facility] [id number] Left 2 Implanted        Procedure(s):  OPEN REDUCTION INTERNAL FIXATION LEFT DISTAL RADIUS FRACTURE  LEFT CARPAL TUNNEL RELEASE    Surgeon(s):  Bal Diaz MD    Procedure(s):  OPEN REDUCTION INTERNAL FIXATION LEFT DISTAL RADIUS FRACTURE  LEFT CARPAL TUNNEL RELEASE    ANESTHESIA: Regional    ESTIMATED BLOOD LOSS: Minimal    COMPLICATIONS: None    TOURNIQUET TIME:   Total Tourniquet Time Documented:  Arm  (Left) - 40 minutes  Total: Arm  (Left) - 40 minutes      INDICATION FOR PROCEDURE:  Anay Solis sustained the above mentioned injuries.  Surgical and non-surgical treatment options were discussed with the patient and their family, as well as the risk and benefits of each option.  Together, we decided to proceed with surgical management of their fracture.  Specific to this treatment plan, we discussed in detail surgical risks including scar, pain, bleeding, infection, anesthetic risks, neurovascular injury, nonunion, malunion, hardware loosening or failure, need for further surgery,  weakness, stiffness, risk of death and potential risk of other unforseen complication.      The patient did consent to the procedure after discussion of the risks and benefits.      DESCRIPTION OF PROCEDURE:  The patient was identified in the holding room. The Left  Left wrist was marked and confirmed as the correct operative site. They were then brought to the OR and general anesthesia was induced. They were transferred onto the OR table in the supine position. All bony prominences were well padded. SCDs were placed on the bilateral legs throughout the case. A timeout was performed, verifying the correct patient, the correct side and the correct procedure. Antibiotics were then administered, and were redosed during the procedure as needed at indicated intervals.    A non-sterile tourniquet was placed on the arm    The upper extremity and shoulder girdle was pre scrubbed and then prepped and draped in routine sterile fashion.     An

## 2024-02-06 ENCOUNTER — TELEPHONE (OUTPATIENT)
Dept: ORTHOPEDIC SURGERY | Age: 39
End: 2024-02-06

## 2024-02-06 NOTE — TELEPHONE ENCOUNTER
Called pt back and confirmed per valarie, that it is okay for her to fly and also confirmed she will be back for her post op appointment on 2/13 w/ valarie, pt states she will be back for that appointment.

## 2024-02-06 NOTE — TELEPHONE ENCOUNTER
She had surgery Thursday. She wants to know if she is allowed to travel on a plane at the end of this week. Please give her a call.

## 2024-02-12 NOTE — PROGRESS NOTES
and therapeutic massage.  (03837 Initial orthotic management and training: Fabrication/adjustments as indicated  (58141) Subsequent orthotic management as indicated  Modalities prn to address pain, spasms, and swelling: (61307) Electrical stimulation - attended  (50839/) Electrical stimulation- unattended  (76250) Hot/cold pack  (78552) Fluidotherapy  (72362) Paraffin  Home exercise program (HEP) development  (84625) Neuromuscular Re-education: to improve balance, coordination, kinesthetic sense, posture and proprioception (e.g., proprioceptive and neuromuscular facilitation, desensitization techniques)  (47457) Kineseotaping for Neuromuscular Re-education : Muscle inhibition or facilitation, space correction, to improve proprioception,; for endurance or correction of postural stabilizers; addressing trophic changes of complex regional pain syndrome  (04391) Kineseotaping for Manual Therapy Techniques for soft tissue mobilization, facilitation of muscles, pain management, lymphatic management, swelling management, scar mobilization, myofascial correction, mechanical joint correction or support  (93402) Kineseotaping for Therapeutic Procedure/Exercise  for strengthening or lengthening a muscle. Used in conjunction with retraining posture, endurance and flexibility    GOALS       Short term goals:  3/13/2024  (4 weeks)  Patient will be I with HEP and use of orthosis.   Increase AROM affected wrist flexion to at least 35 ° to improve function  Increase AROM affected wrist extension to at least 33 ° to improve function  Increase AROM affected forearm supination to at least 45 ° to improve function with ADL's.  Increase AROM affected forearm pronation to at least 55 ° to improve function with ADL's.  Increase active composite digit flexion left hand so she is able to touch her finger tips to her palm without difficulty.  Pt will increase active thumb opposition to increase Kapandji score to at least a 7 to improve

## 2024-02-13 ENCOUNTER — EVALUATION (OUTPATIENT)
Age: 39
End: 2024-02-13
Payer: COMMERCIAL

## 2024-02-13 ENCOUNTER — OFFICE VISIT (OUTPATIENT)
Dept: ORTHOPEDIC SURGERY | Age: 39
End: 2024-02-13

## 2024-02-13 DIAGNOSIS — M25.532 PAIN IN LEFT WRIST: Primary | ICD-10-CM

## 2024-02-13 DIAGNOSIS — Z78.9 DECREASED ACTIVITIES OF DAILY LIVING (ADL): ICD-10-CM

## 2024-02-13 DIAGNOSIS — Z76.89 ENCOUNTER TO ESTABLISH CARE WITH NEW DOCTOR: ICD-10-CM

## 2024-02-13 DIAGNOSIS — M25.632 STIFFNESS OF LEFT WRIST JOINT: ICD-10-CM

## 2024-02-13 DIAGNOSIS — S52.552A OTHER EXTRAARTICULAR FRACTURE OF LOWER END OF LEFT RADIUS, INITIAL ENCOUNTER FOR CLOSED FRACTURE: ICD-10-CM

## 2024-02-13 DIAGNOSIS — M25.432 EFFUSION OF LEFT WRIST: ICD-10-CM

## 2024-02-13 DIAGNOSIS — M62.81 HAND MUSCLE WEAKNESS: ICD-10-CM

## 2024-02-13 DIAGNOSIS — S62.102A CLOSED FRACTURE OF LEFT WRIST, INITIAL ENCOUNTER: Primary | ICD-10-CM

## 2024-02-13 PROCEDURE — 97110 THERAPEUTIC EXERCISES: CPT | Performed by: OCCUPATIONAL THERAPIST

## 2024-02-13 PROCEDURE — 97165 OT EVAL LOW COMPLEX 30 MIN: CPT | Performed by: OCCUPATIONAL THERAPIST

## 2024-02-13 NOTE — PROGRESS NOTES
Orthopaedic Hand Surgery Note    Name: Anay Solis  Age: 38 y.o.  YOB: 1985  Gender: female  MRN: [id number]    Post Operative Visit: Open Reduction Internal Fixation Left Distal Radius Fracture - Left and Left Carpal Tunnel Release - Left    HPI: Patient is status post Open Reduction Internal Fixation Left Distal Radius Fracture - Left and Left Carpal Tunnel Release - Left on 2/1/2024. Doing well. Pleased with current progress. Denies fevers or chills.  She has not yet started her vitamin D.    Physical Examination:  Wound healing well.  Sutures are intact with no erythema or drainage.  Sensation is intact in all fingers. Motor exam reveals no deficits. limited finger range of motion. Limited wrist motion due to pain.    Imaging:     Wrist  XR: AP, Lateral, Oblique and wrist facet view     Clinical Indication:  1. Closed fracture of left wrist, initial encounter    2. Other extraarticular fracture of lower end of left radius, initial encounter for closed fracture         Report: AP, lateral, oblique and wrist facet x-ray on the left demonstrates distal radius fracture status post plate and screw osteosynthesis in acceptable alignment, no hardware complication is noted    Impression: Distal radius fracture status post osteosynthesis as described above     Lou Brennan, APRN - CNP     Assessment:   1. Closed fracture of left wrist, initial encounter    2. Other extraarticular fracture of lower end of left radius, initial encounter for closed fracture         Status post Open Reduction Internal Fixation Left Distal Radius Fracture - Left and Left Carpal Tunnel Release - Left on 2/1/2024    Plan:  We discussed the treatment and therapy plan. Patient will start occupational therapy to avoid stiffness. We will continue protective splinting at all times until 6 weeks post-op and after 6 weeks only in unprotected environments. Patient will start occupational therapy to avoid stiffness. Therapy

## 2024-02-13 NOTE — PROGRESS NOTES
Patient was fit for a Wrist/Forearm lacer for patients left elbow pain. Patient is instructed the brace should fit nicely with in the palm and right below the knuckles on the dorsal side of the hand. The patient was aware the brace should fit snuggly around the wrist/forearm area. The strap placed through the thumb and first finger should fit comfortably to insure the brace does not slide or shift. Patient read and signed documenting they understand and agree to [facility]'s current DME return policy.

## 2024-02-15 ENCOUNTER — TELEPHONE (OUTPATIENT)
Dept: ORTHOPEDIC SURGERY | Age: 39
End: 2024-02-15

## 2024-02-15 ENCOUNTER — OFFICE VISIT (OUTPATIENT)
Dept: ORTHOPEDIC SURGERY | Age: 39
End: 2024-02-15

## 2024-02-15 DIAGNOSIS — S62.102A CLOSED FRACTURE OF LEFT WRIST, INITIAL ENCOUNTER: Primary | ICD-10-CM

## 2024-02-15 PROCEDURE — 99024 POSTOP FOLLOW-UP VISIT: CPT | Performed by: NURSE PRACTITIONER

## 2024-02-15 NOTE — PROGRESS NOTES
Orthopaedic Hand Surgery Note    Name: Anay Solis  YOB: 1985  Gender: female  MRN: [id number]    Post Operative Visit: Open Reduction Internal Fixation Left Distal Radius Fracture - Left and Left Carpal Tunnel Release - Left    HPI: Patient is status post Open Reduction Internal Fixation Left Distal Radius Fracture - Left and Left Carpal Tunnel Release - Left on 2/1/2024.  Patient called today with a stitch left in her hand.    Physical Examination:  Wound healing well.  There is a tail of a stitch left.  Sensation is intact in all fingers. Motor exam reveals no deficits. Good finger and wrist range of motion.    Imaging:     None    Assessment:   1. Closed fracture of left wrist, initial encounter         Status post Open Reduction Internal Fixation Left Distal Radius Fracture - Left and Left Carpal Tunnel Release - Left on 2/1/2024    Plan:  We discussed the post operative course and progression.  The tail of the stitch was removed without difficulty.  Patient tolerated well.  We will see her at her next scheduled visit.    CHRISTIAN Evangelista - CNP  02/15/24  1:11 PM

## 2024-02-19 NOTE — PROGRESS NOTES
[facility]THOPAED[facility]  [address] [address]  Dept: [phone]      Occupational Therapy Daily Note     Referring MD: Lou Brennan APRN - C*    Diagnosis:     ICD-10-CM    1. Pain in left wrist  M25.532       2. Decreased activities of daily living (ADL)  Z78.9       3. Hand muscle weakness  M62.81       4. Stiffness of left wrist joint  M25.632       5. Effusion of left wrist  M25.432            Surgery/Medical Dx: Date 24 Left two part extra-articular distal radius fracture with internal fixation, left open carpal tunnel release s/p Closed displaced fracture of styloid process of left ulna; Closed extraarticular fracture of left distal radius, Left carpal tunnel syndrome       Therapy precautions: Fracture precautions    History of injury/onset : Pt fell at home on 24 when she slipped on a slipper. She was treated in the ER and splinted.     Total Direct Treatment Time: 40 min  Total In Office Time: 50 min  Modifier needed: No  Episode visit count:  2     Preferred Name: Anay    PERTINENT MEDICAL HISTORY     PMHX & Meds:   Past Medical History:   Diagnosis Date    Marijuana smoker     patient stated 1-2 times a weekend    PONV (postoperative nausea and vomiting)     unsure if medication was given   ,   Past Surgical History:   Procedure Laterality Date    CARPAL TUNNEL RELEASE Left 2024    LEFT CARPAL TUNNEL RELEASE performed by Bal Diaz MD at [facility]     SECTION      x2    FOREARM SURGERY Left 2024    OPEN REDUCTION INTERNAL FIXATION LEFT DISTAL RADIUS FRACTURE performed by Bal Diaz MD at [facility]    TUBAL LIGATION        Medications. : Reviewed in chart  Allergies:   Allergies   Allergen Reactions    Milk-Related Compounds Itching and Rash      Medications: reviewed in chart  Home program compliance:  initial eval    SUBJECTIVE     Current Symptoms/Chief complaints:   Chief Complaint   Patient presents with    Wrist Injury     Neuro

## 2024-02-20 ENCOUNTER — TREATMENT (OUTPATIENT)
Age: 39
End: 2024-02-20
Payer: COMMERCIAL

## 2024-02-20 DIAGNOSIS — M62.81 HAND MUSCLE WEAKNESS: ICD-10-CM

## 2024-02-20 DIAGNOSIS — M25.532 PAIN IN LEFT WRIST: Primary | ICD-10-CM

## 2024-02-20 DIAGNOSIS — Z78.9 DECREASED ACTIVITIES OF DAILY LIVING (ADL): ICD-10-CM

## 2024-02-20 DIAGNOSIS — M25.432 EFFUSION OF LEFT WRIST: ICD-10-CM

## 2024-02-20 DIAGNOSIS — M25.632 STIFFNESS OF LEFT WRIST JOINT: ICD-10-CM

## 2024-02-20 PROCEDURE — 97110 THERAPEUTIC EXERCISES: CPT | Performed by: OCCUPATIONAL THERAPIST

## 2024-02-21 NOTE — PROGRESS NOTES
will be able to sleep through the night with no pain in affected UE.  Pts Quick DASH functional assessment score will be less than 20% functional deficit        [employer] Portal   Access Code: [email or web address]  URL: [email or web address]  Date: 02/13/2024  Prepared by: Jose Francisco Nguyễn    Exercises  - Seated Elbow Flexion and Extension AROM  - 2 x daily - 7 x weekly - 1 sets - 10 reps - 5 hold  - Seated Forearm Pronation and Supination AROM  - 5 x daily - 7 x weekly - 1 sets - 10 reps - 5 hold  - Wrist Flexion Extension AROM - Palms Down  - 5 x daily - 7 x weekly - 1 sets - 10 reps - 5 hold  - Seated Wrist Flexion AROM  - 5 x daily - 7 x weekly - 1 sets - 10 reps - 5 hold  - Seated Wrist Radial and Ulnar Deviation AROM  - 5 x daily - 7 x weekly - 1 sets - 10 reps - 5 hold  - Digit Flexion Extension  - 5 x daily - 7 x weekly - 1 sets - 10 reps - 5 hold  - Seated Single Digit Intrinsic Stretch  - 5 x daily - 7 x weekly - 1 sets - 10 reps - 5 hold  - Finger MP Flexion AROM  - 5 x daily - 7 x weekly - 1 sets - 10 reps - 5 hold  - Seated Claw Fist AROM  - 5 x daily - 7 x weekly - 1 sets - 10 reps - 5 hold  - Individual Finger Extensions  - 5 x daily - 7 x weekly - 1 sets - 10 reps - 5 hold  - Thumb Opposition  - 5 x daily - 7 x weekly - 1 sets - 10 reps - 5 hold  - Finger Spreading  - 5 x daily - 7 x weekly - 1 sets - 10 reps - 5 hold  - Seated Thumb Composite Flexion AROM  - 5 x daily - 7 x weekly - 1 sets - 10 reps - 5 hold  OT Protocols

## 2024-02-22 ENCOUNTER — TREATMENT (OUTPATIENT)
Age: 39
End: 2024-02-22
Payer: COMMERCIAL

## 2024-02-22 DIAGNOSIS — M25.432 EFFUSION OF LEFT WRIST: ICD-10-CM

## 2024-02-22 DIAGNOSIS — M62.81 HAND MUSCLE WEAKNESS: ICD-10-CM

## 2024-02-22 DIAGNOSIS — Z78.9 DECREASED ACTIVITIES OF DAILY LIVING (ADL): ICD-10-CM

## 2024-02-22 DIAGNOSIS — M25.532 PAIN IN LEFT WRIST: Primary | ICD-10-CM

## 2024-02-22 DIAGNOSIS — M25.632 STIFFNESS OF LEFT WRIST JOINT: ICD-10-CM

## 2024-02-22 PROCEDURE — 97110 THERAPEUTIC EXERCISES: CPT | Performed by: OCCUPATIONAL THERAPIST

## 2024-02-27 ENCOUNTER — TREATMENT (OUTPATIENT)
Age: 39
End: 2024-02-27
Payer: COMMERCIAL

## 2024-02-27 DIAGNOSIS — M25.532 PAIN IN LEFT WRIST: Primary | ICD-10-CM

## 2024-02-27 DIAGNOSIS — M25.632 STIFFNESS OF LEFT WRIST JOINT: ICD-10-CM

## 2024-02-27 DIAGNOSIS — Z78.9 DECREASED ACTIVITIES OF DAILY LIVING (ADL): ICD-10-CM

## 2024-02-27 DIAGNOSIS — M62.81 HAND MUSCLE WEAKNESS: ICD-10-CM

## 2024-02-27 PROCEDURE — 97110 THERAPEUTIC EXERCISES: CPT

## 2024-02-27 NOTE — PROGRESS NOTES
- 1 sets - 10 reps - 5 hold  - Seated Thumb Composite Flexion AROM  - 5 x daily - 7 x weekly - 1 sets - 10 reps - 5 hold  OT Protocols

## 2024-02-29 ENCOUNTER — TELEPHONE (OUTPATIENT)
Dept: ORTHOPEDIC SURGERY | Age: 39
End: 2024-02-29

## 2024-02-29 NOTE — TELEPHONE ENCOUNTER
I returned patient call. All questions have been answered. She understands to call back with any other questions or concerns.

## 2024-02-29 NOTE — TELEPHONE ENCOUNTER
She has two questions.  OT said she needed to use her wrist more. She did a lot of exercises last night and today her wrist is very sore. Is this normal and okay.    Is she allowed to use the steam room yet? Please call her with answers to these two questions.

## 2024-03-04 ENCOUNTER — TELEPHONE (OUTPATIENT)
Age: 39
End: 2024-03-04

## 2024-03-04 NOTE — TELEPHONE ENCOUNTER
LVM to cancel 3/5 appt due to patient having reached maximum service limit. The rest of the scheduled appts will need cancelled also, unless patient wants to pay out of pocket. If patient calls or comes in, Cecy SONI or Alivia will need to speak with her.

## 2024-03-05 ENCOUNTER — TELEPHONE (OUTPATIENT)
Age: 39
End: 2024-03-05

## 2024-03-05 NOTE — PROGRESS NOTES
[facility]  [address] [address]  Dept: [phone]      Occupational Therapy Daily Note     Referring MD: Bal Diaz MD     Diagnosis:     ICD-10-CM    1. Pain in left wrist  M25.532       2. Decreased activities of daily living (ADL)  Z78.9       3. Stiffness of left wrist joint  M25.632       4. Hand muscle weakness  M62.81       5. Effusion of left wrist  M25.432              Surgery/Medical Dx: Date 24 Left two part extra-articular distal radius fracture with internal fixation, left open carpal tunnel release s/p Closed displaced fracture of styloid process of left ulna; Closed extraarticular fracture of left distal radius, Left carpal tunnel syndrome       Therapy precautions: Fracture precautions    History of injury/onset : Pt fell at home on 24 when she slipped on a slipper. She was treated in the ER and splinted.     Total Direct Treatment Time: 45 min  Total In Office Time: 60 min  Modifier needed: No  Episode visit count:  5     Preferred Name: Anay    PERTINENT MEDICAL HISTORY     PMHX & Meds:   Past Medical History:   Diagnosis Date    Marijuana smoker     patient stated 1-2 times a weekend    PONV (postoperative nausea and vomiting)     unsure if medication was given   ,   Past Surgical History:   Procedure Laterality Date    CARPAL TUNNEL RELEASE Left 2024    LEFT CARPAL TUNNEL RELEASE performed by Bal Diaz MD at [facility]     SECTION      x2    FOREARM SURGERY Left 2024    OPEN REDUCTION INTERNAL FIXATION LEFT DISTAL RADIUS FRACTURE performed by Bal Diaz MD at [facility]    TUBAL LIGATION  2006      Medications. : Reviewed in chart  Allergies:   Allergies   Allergen Reactions    Milk-Related Compounds Itching and Rash      Medications: reviewed in chart  Home program compliance: Completing as prescribed    SUBJECTIVE     Current Symptoms/Chief complaints:   Chief Complaint   Patient presents with    Wrist Injury     Neuro

## 2024-03-05 NOTE — TELEPHONE ENCOUNTER
LVM for patient to let her know that the therapy supervisor and OT lead are looking into her chart and trying to best determine how to treat her since her OT benefits are maxed for this benefit period. I told her to keep her scheduled appt for Thurs and we could discuss further.

## 2024-03-07 ENCOUNTER — TREATMENT (OUTPATIENT)
Age: 39
End: 2024-03-07

## 2024-03-07 DIAGNOSIS — Z78.9 DECREASED ACTIVITIES OF DAILY LIVING (ADL): ICD-10-CM

## 2024-03-07 DIAGNOSIS — M25.632 STIFFNESS OF LEFT WRIST JOINT: ICD-10-CM

## 2024-03-07 DIAGNOSIS — M25.432 EFFUSION OF LEFT WRIST: ICD-10-CM

## 2024-03-07 DIAGNOSIS — M62.81 HAND MUSCLE WEAKNESS: ICD-10-CM

## 2024-03-07 DIAGNOSIS — M25.532 PAIN IN LEFT WRIST: Primary | ICD-10-CM

## 2024-03-11 NOTE — PROGRESS NOTES
Stretch  - 3 x daily - 7 x weekly - 1 sets - 5-10 reps - 10 hold  - Seated Wrist Extension PROM  - 3 x daily - 7 x weekly - 1 sets - 5-10 reps - 10 hold  - Forearm Supination Stretch  - 3 x daily - 7 x weekly - 1 sets - 5-10 reps - 5 hold        ASSESSMENT     The patient is a 38 year old female s/p left two part extra-articular distal radius fracture with internal fixation, left open carpal tunnel release s/p Closed displaced fracture of styloid process of left ulna; Closed extraarticular fracture of left distal radius on 2/2/24.   Pt is making good progress towards goals. She has increased ROM, strength and function of left UE. She rates herself as having a 66% functional deficit per the Quick DASH assessment. She continues to have wrist and forearm stiffness. Added PROM to home program. She can make a fist. There is weakness. Mild pain and swelling in left hand and wrist.      PLAN OF CARE     Continue 2x per week to maximize function of left UE    GOALS       Short term goals:  3/13/2024  (4 weeks)  Patient will be I with HEP and use of orthosis.  Met)  Increase AROM affected wrist flexion to at least 35 ° to improve function (met)  Increase AROM affected wrist extension to at least 33 ° to improve function (met)  Increase AROM affected forearm supination to at least 45 ° to improve function with ADL's. (Not met)  Increase AROM affected forearm pronation to at least 55 ° to improve function with ADL's. (Met)  Increase active composite digit flexion left hand so she is able to touch her finger tips to her palm without difficulty. (Met)  Pt will increase active thumb opposition to increase Kapandji score to at least a 7 to improve grasp. (Met)  Pts Quick DASH functional assessment score will be less than 70 % functional deficit (met, 66%)    New short term goals: (by 4/10/24)  Increase AROM left wrist flexion from 25 deg to at least 35 deg to improve function.  Increase AROM left wrist extension from 45 deg to at

## 2024-03-12 ENCOUNTER — TREATMENT (OUTPATIENT)
Age: 39
End: 2024-03-12
Payer: COMMERCIAL

## 2024-03-12 ENCOUNTER — TRANSCRIBE ORDERS (OUTPATIENT)
Dept: SCHEDULING | Age: 39
End: 2024-03-12

## 2024-03-12 DIAGNOSIS — Z78.9 DECREASED ACTIVITIES OF DAILY LIVING (ADL): ICD-10-CM

## 2024-03-12 DIAGNOSIS — M25.632 STIFFNESS OF LEFT WRIST JOINT: ICD-10-CM

## 2024-03-12 DIAGNOSIS — Z12.31 ENCOUNTER FOR SCREENING MAMMOGRAM FOR MALIGNANT NEOPLASM OF BREAST: Primary | ICD-10-CM

## 2024-03-12 DIAGNOSIS — M25.532 PAIN IN LEFT WRIST: Primary | ICD-10-CM

## 2024-03-12 DIAGNOSIS — M62.81 HAND MUSCLE WEAKNESS: ICD-10-CM

## 2024-03-12 DIAGNOSIS — M25.432 EFFUSION OF LEFT WRIST: ICD-10-CM

## 2024-03-12 PROCEDURE — 97110 THERAPEUTIC EXERCISES: CPT | Performed by: OCCUPATIONAL THERAPIST

## 2024-03-12 PROCEDURE — 97010 HOT OR COLD PACKS THERAPY: CPT | Performed by: OCCUPATIONAL THERAPIST

## 2024-03-13 ENCOUNTER — OFFICE VISIT (OUTPATIENT)
Dept: ORTHOPEDIC SURGERY | Age: 39
End: 2024-03-13

## 2024-03-13 DIAGNOSIS — S52.552A OTHER EXTRAARTICULAR FRACTURE OF LOWER END OF LEFT RADIUS, INITIAL ENCOUNTER FOR CLOSED FRACTURE: Primary | ICD-10-CM

## 2024-03-13 PROCEDURE — 99024 POSTOP FOLLOW-UP VISIT: CPT | Performed by: ORTHOPAEDIC SURGERY

## 2024-03-13 NOTE — PROGRESS NOTES
Orthopaedic Hand Surgery Note    Name: Anay Solis  Age: 38 y.o.  YOB: 1985  Gender: female  MRN: [id number]    Post Operative Visit: Open Reduction Internal Fixation Left Distal Radius Fracture - Left and Left Carpal Tunnel Release - Left    HPI: Patient is status post Open Reduction Internal Fixation Left Distal Radius Fracture - Left and Left Carpal Tunnel Release - Left on 2/1/2024. Patient reports soreness but no pain especially with therapy stretches.    Physical Examination:  Well-healed surgical wounds. Sensation is intact in all fingers. Motor exam reveals no deficits.  She has limited wrist motion, 45 degrees of extension, 35 degrees of flexion, full pronation, 60 degrees of supination, I was able to passively stretch her supination to 75 degrees.    Imaging:     left Wrist  XR: AP, Lateral, Oblique and wrist facet view     Clinical Indication:  1. Other extraarticular fracture of lower end of left radius, initial encounter for closed fracture         Report: AP, lateral, oblique and wrist facet x-ray demonstrates distal radius fracture status post plate and screw osteosynthesis in acceptable alignment, no hardware complication is noted    Impression: Distal radius fracture status post osteosynthesis as described above     Bal Diaz MD         Assessment:   1. Other extraarticular fracture of lower end of left radius, initial encounter for closed fracture         Status post Open Reduction Internal Fixation Left Distal Radius Fracture - Left and Left Carpal Tunnel Release - Left on 2/1/2024    Plan:  We discussed the post operative course and progression.  Aggressive passive motion as tolerated, continued therapy, I will reassess the patient in 2 months with final x-rays    Bal Diaz MD  03/13/24  11:39 AM

## 2024-03-14 ENCOUNTER — TREATMENT (OUTPATIENT)
Age: 39
End: 2024-03-14

## 2024-03-14 DIAGNOSIS — M25.432 EFFUSION OF LEFT WRIST: ICD-10-CM

## 2024-03-14 DIAGNOSIS — M25.632 STIFFNESS OF LEFT WRIST JOINT: ICD-10-CM

## 2024-03-14 DIAGNOSIS — Z78.9 DECREASED ACTIVITIES OF DAILY LIVING (ADL): ICD-10-CM

## 2024-03-14 DIAGNOSIS — M25.532 PAIN IN LEFT WRIST: Primary | ICD-10-CM

## 2024-03-14 DIAGNOSIS — M62.81 HAND MUSCLE WEAKNESS: ICD-10-CM

## 2024-03-14 NOTE — PROGRESS NOTES
- 5 x daily - 7 x weekly - 1 sets - 10 reps - 5 hold  - Individual Finger Extensions  - 5 x daily - 7 x weekly - 1 sets - 10 reps - 5 hold  - Thumb Opposition  - 5 x daily - 7 x weekly - 1 sets - 10 reps - 5 hold  - Finger Spreading  - 5 x daily - 7 x weekly - 1 sets - 10 reps - 5 hold  - Seated Thumb Composite Flexion AROM  - 5 x daily - 7 x weekly - 1 sets - 10 reps - 5 hold      Access Code: [email or web address]  URL: [email or web address]  Date: 03/12/2024  Prepared by: Jose Francisco Nguyễn    Exercises  - Seated Wrist Flexion Stretch  - 3 x daily - 7 x weekly - 1 sets - 5-10 reps - 10 hold  - Wrist Prayer Stretch  - 3 x daily - 7 x weekly - 1 sets - 5-10 reps - 10 hold  - Seated Wrist Extension PROM  - 3 x daily - 7 x weekly - 1 sets - 5-10 reps - 10 hold  - Forearm Supination Stretch  - 3 x daily - 7 x weekly - 1 sets - 5-10 reps - 5 hold    OT Protocols

## 2024-03-18 NOTE — PROGRESS NOTES
[facility] ORTHOPAED[facility]  [address] [address]  Dept: [phone]      Occupational Therapy Daily Note     Referring MD: Bal Diaz MD     Diagnosis:     ICD-10-CM    1. Pain in left wrist  M25.532       2. Decreased activities of daily living (ADL)  Z78.9       3. Effusion of left wrist  M25.432       4. Hand muscle weakness  M62.81       5. Stiffness of left wrist joint  M25.632              Surgery/Medical Dx: Date 24 Left two part extra-articular distal radius fracture with internal fixation, left open carpal tunnel release s/p Closed displaced fracture of styloid process of left ulna; Closed extraarticular fracture of left distal radius, Left carpal tunnel syndrome       Therapy precautions: Fracture precautions    History of injury/onset : Pt fell at home on 24 when she slipped on a slipper. She was treated in the ER and splinted.     Total Direct Treatment Time: 40 min  Total In Office Time: 45 min  Modifier needed: No  Episode visit count:  8     Preferred Name: Anay    PERTINENT MEDICAL HISTORY     PMHX & Meds:   Past Medical History:   Diagnosis Date    Marijuana smoker     patient stated 1-2 times a weekend    PONV (postoperative nausea and vomiting)     unsure if medication was given   ,   Past Surgical History:   Procedure Laterality Date    CARPAL TUNNEL RELEASE Left 2024    LEFT CARPAL TUNNEL RELEASE performed by Bal Diaz MD at [facility]     SECTION      x2    FOREARM SURGERY Left 2024    OPEN REDUCTION INTERNAL FIXATION LEFT DISTAL RADIUS FRACTURE performed by Bal Diaz MD at [facility]    TUBAL LIGATION  2006      Medications. : Reviewed in chart  Allergies:   Allergies   Allergen Reactions    Milk-Related Compounds Itching and Rash      Medications: reviewed in chart  Home program compliance: Completing as prescribed    SUBJECTIVE     Current Symptoms/Chief complaints:   Chief Complaint   Patient presents with    Wrist Injury     Neuro

## 2024-03-19 ENCOUNTER — TREATMENT (OUTPATIENT)
Age: 39
End: 2024-03-19
Payer: COMMERCIAL

## 2024-03-19 DIAGNOSIS — M25.432 EFFUSION OF LEFT WRIST: ICD-10-CM

## 2024-03-19 DIAGNOSIS — M25.632 STIFFNESS OF LEFT WRIST JOINT: ICD-10-CM

## 2024-03-19 DIAGNOSIS — M62.81 HAND MUSCLE WEAKNESS: ICD-10-CM

## 2024-03-19 DIAGNOSIS — M25.532 PAIN IN LEFT WRIST: Primary | ICD-10-CM

## 2024-03-19 DIAGNOSIS — Z78.9 DECREASED ACTIVITIES OF DAILY LIVING (ADL): ICD-10-CM

## 2024-03-19 PROCEDURE — 97110 THERAPEUTIC EXERCISES: CPT | Performed by: OCCUPATIONAL THERAPIST

## 2024-03-25 NOTE — PROGRESS NOTES
affected forearm supination to improve ability  with tasks like opening doors, hold a plate.  Pt will have  at least 80 °  of AROM affected forearm pronation to improve ability with tasks like writing and placing/acquiring items on tabletop.  Pt will be able to oppose left thumb to a Kapandji score of 9 or higher to be able to grasp objects  Pt will have adequate  strength in left hand to be able to grasp and hold an object and open containers  Pt will be able to lift and carry items (ie grocery bags, laundry basket etc) with affected UE pain 2 of 10 or less.  Pt will be able to sleep through the night with no pain in affected UE.  Pts Quick DASH functional assessment score will be less than 20% functional deficit        [facility]   Access Code: [email or web address]  URL: [email or web address]  Date: 02/13/2024  Prepared by: Jose Francisco Nguyễn    Exercises  - Seated Elbow Flexion and Extension AROM  - 2 x daily - 7 x weekly - 1 sets - 10 reps - 5 hold  - Seated Forearm Pronation and Supination AROM  - 5 x daily - 7 x weekly - 1 sets - 10 reps - 5 hold  - Wrist Flexion Extension AROM - Palms Down  - 5 x daily - 7 x weekly - 1 sets - 10 reps - 5 hold  - Seated Wrist Flexion AROM  - 5 x daily - 7 x weekly - 1 sets - 10 reps - 5 hold  - Seated Wrist Radial and Ulnar Deviation AROM  - 5 x daily - 7 x weekly - 1 sets - 10 reps - 5 hold  - Digit Flexion Extension  - 5 x daily - 7 x weekly - 1 sets - 10 reps - 5 hold  - Seated Single Digit Intrinsic Stretch  - 5 x daily - 7 x weekly - 1 sets - 10 reps - 5 hold  - Finger MP Flexion AROM  - 5 x daily - 7 x weekly - 1 sets - 10 reps - 5 hold  - Seated Claw Fist AROM  - 5 x daily - 7 x weekly - 1 sets - 10 reps - 5 hold  - Individual Finger Extensions  - 5 x daily - 7 x weekly - 1 sets - 10 reps - 5 hold  - Thumb Opposition  - 5 x daily - 7 x weekly - 1 sets - 10 reps - 5 hold  - Finger Spreading  - 5 x daily - 7 x weekly - 1 sets - 10 reps - 5 hold  - Seated Thumb

## 2024-03-26 ENCOUNTER — TREATMENT (OUTPATIENT)
Age: 39
End: 2024-03-26
Payer: COMMERCIAL

## 2024-03-26 DIAGNOSIS — M62.81 HAND MUSCLE WEAKNESS: ICD-10-CM

## 2024-03-26 DIAGNOSIS — Z78.9 DECREASED ACTIVITIES OF DAILY LIVING (ADL): ICD-10-CM

## 2024-03-26 DIAGNOSIS — M25.632 STIFFNESS OF LEFT WRIST JOINT: ICD-10-CM

## 2024-03-26 DIAGNOSIS — M25.432 EFFUSION OF LEFT WRIST: ICD-10-CM

## 2024-03-26 DIAGNOSIS — M25.532 PAIN IN LEFT WRIST: Primary | ICD-10-CM

## 2024-03-26 PROCEDURE — 97110 THERAPEUTIC EXERCISES: CPT | Performed by: OCCUPATIONAL THERAPIST

## 2024-03-26 PROCEDURE — 97035 APP MDLTY 1+ULTRASOUND EA 15: CPT | Performed by: OCCUPATIONAL THERAPIST

## 2024-04-01 NOTE — PROGRESS NOTES
Neuro screen: c/o numbness and tingling left  hand    Patient Stated Goals: \"Be able to use my arm\"    Pt reports compliance to home program.    OBJECTIVE       ROM MEASUREMENT                          ROM RIGHT  (AROM)   IE LEFT  (AROM)  IE Left  2/20/24 Left  2/22/24 Left  3/7/24 Left  3/12/24 Left  3/14/24 LEFT  3/19/24 LEFT  4/2/24   Elbow extension/flexion  WNL           Supination/Pronation  20/45    35/85 40/ 50/ 65/   Wrist Extension/Flexion  28/25 30/10 40/25 45/20 45/25 50/28 55/30 65/40   RD/UD  0/15    5/20         RIGHT  (AROM)   IE LEFT  (AROM)  IE Left  3/7/24 Left  3/12/24    Thumb MP ext/flex  /30 /60    Thumb IP ext/flex  /15 /35    Radial add/abduction  NT     Palmar add/abduction  45     Opposition (Kapandji):  RF (5)       8     3/5/24Active flexion to DPC 0 0 0 0      strength: Right 65 psi; left 32 psi (4/2/24)    Strength/MMT (0-5 Scale) : Not tested secondary to precautions      Treatment:   Hot Pack (62415) x 10 minutes to left hand/wrist prior to treatment for moist heat/tissue extensibility in preparation for treatment.    Therapeutic exercise (07057) x 40 min:  Home Exercise Program review  Use of heat and ice as needed for pain and inflammation reduction. Precautions reviewed.  OT POC and rationale, education for surgical precautions and pain management, edema management  A/AAROM wrist flex/ext, UD/RD  A/AAROM forearm sup/pro  Gentle PROM wrist flex/ext and forearm sup with prolonged stretch at pain free end range  STM/scar mobilization and massage by therapist  Theraputty, yellow, , roll, pinch, extend x 5 sets  Theraputty, yellow, Flexbar presses, 20  Theraputty, yellow, Puttycise tools, L-bar, large knob, small knob, 20 each  Wrist flex/ext, 2 lb, 20  Resisted radial deviation, 2 lb, 20  Resisted forearm sup/pro, 2 lbs, 20  Flexbar, yellow, pro/sup, 20 reps  Hand exerciser, 30 lbs, 2 sets x 20    Ultrasound (95725) x 8 minutes including set up and application of modality

## 2024-04-02 ENCOUNTER — TREATMENT (OUTPATIENT)
Age: 39
End: 2024-04-02
Payer: COMMERCIAL

## 2024-04-02 DIAGNOSIS — M62.81 HAND MUSCLE WEAKNESS: ICD-10-CM

## 2024-04-02 DIAGNOSIS — M25.532 PAIN IN LEFT WRIST: Primary | ICD-10-CM

## 2024-04-02 DIAGNOSIS — M25.432 EFFUSION OF LEFT WRIST: ICD-10-CM

## 2024-04-02 DIAGNOSIS — M25.632 STIFFNESS OF LEFT WRIST JOINT: ICD-10-CM

## 2024-04-02 DIAGNOSIS — Z78.9 DECREASED ACTIVITIES OF DAILY LIVING (ADL): ICD-10-CM

## 2024-04-02 PROCEDURE — 97010 HOT OR COLD PACKS THERAPY: CPT | Performed by: OCCUPATIONAL THERAPIST

## 2024-04-02 PROCEDURE — 97035 APP MDLTY 1+ULTRASOUND EA 15: CPT | Performed by: OCCUPATIONAL THERAPIST

## 2024-04-02 PROCEDURE — 97110 THERAPEUTIC EXERCISES: CPT | Performed by: OCCUPATIONAL THERAPIST

## 2024-04-08 NOTE — PROGRESS NOTES
[facility]  [address] [address]  Dept: [phone]      Occupational Therapy Progress Note     Referring MD: Bal Diaz MD     Diagnosis:     ICD-10-CM    1. Pain in left wrist  M25.532       2. Decreased activities of daily living (ADL)  Z78.9       3. Effusion of left wrist  M25.432       4. Hand muscle weakness  M62.81       5. Stiffness of left wrist joint  M25.632              Surgery/Medical Dx: Date 24 Left two part extra-articular distal radius fracture with internal fixation, left open carpal tunnel release s/p Closed displaced fracture of styloid process of left ulna; Closed extraarticular fracture of left distal radius, Left carpal tunnel syndrome       Therapy precautions: Fracture precautions    History of injury/onset : Pt fell at home on 24 when she slipped on a slipper. She was treated in the ER and splinted.     Total Direct Treatment Time: 53 min  Total In Office Time: 55 min  Modifier needed: No  Episode visit count:  11     Preferred Name: Anay    PERTINENT MEDICAL HISTORY     PMHX & Meds:   Past Medical History:   Diagnosis Date    Marijuana smoker     patient stated 1-2 times a weekend    PONV (postoperative nausea and vomiting)     unsure if medication was given   ,   Past Surgical History:   Procedure Laterality Date    CARPAL TUNNEL RELEASE Left 2024    LEFT CARPAL TUNNEL RELEASE performed by Bal Diaz MD at [facility]     SECTION      x2    FOREARM SURGERY Left 2024    OPEN REDUCTION INTERNAL FIXATION LEFT DISTAL RADIUS FRACTURE performed by Bal Diaz MD at [facility]    TUBAL LIGATION  2006      Medications. : Reviewed in chart  Allergies:   Allergies   Allergen Reactions    Milk-Related Compounds Itching and Rash      Medications: reviewed in chart  Home program compliance: Completing as prescribed    SUBJECTIVE     Current Symptoms/Chief complaints:   Chief Complaint   Patient presents with    Wrist Injury

## 2024-04-09 ENCOUNTER — TREATMENT (OUTPATIENT)
Age: 39
End: 2024-04-09
Payer: COMMERCIAL

## 2024-04-09 DIAGNOSIS — M25.432 EFFUSION OF LEFT WRIST: ICD-10-CM

## 2024-04-09 DIAGNOSIS — M25.532 PAIN IN LEFT WRIST: Primary | ICD-10-CM

## 2024-04-09 DIAGNOSIS — M25.632 STIFFNESS OF LEFT WRIST JOINT: ICD-10-CM

## 2024-04-09 DIAGNOSIS — Z78.9 DECREASED ACTIVITIES OF DAILY LIVING (ADL): ICD-10-CM

## 2024-04-09 DIAGNOSIS — M62.81 HAND MUSCLE WEAKNESS: ICD-10-CM

## 2024-04-09 PROCEDURE — 97010 HOT OR COLD PACKS THERAPY: CPT | Performed by: OCCUPATIONAL THERAPIST

## 2024-04-09 PROCEDURE — 97110 THERAPEUTIC EXERCISES: CPT | Performed by: OCCUPATIONAL THERAPIST

## 2024-04-09 PROCEDURE — 97035 APP MDLTY 1+ULTRASOUND EA 15: CPT | Performed by: OCCUPATIONAL THERAPIST

## 2024-04-16 ENCOUNTER — OFFICE VISIT (OUTPATIENT)
Dept: INTERNAL MEDICINE CLINIC | Facility: CLINIC | Age: 39
End: 2024-04-16
Payer: COMMERCIAL

## 2024-04-16 VITALS
SYSTOLIC BLOOD PRESSURE: 128 MMHG | TEMPERATURE: 98 F | BODY MASS INDEX: 22.16 KG/M2 | OXYGEN SATURATION: 100 % | WEIGHT: 129.8 LBS | DIASTOLIC BLOOD PRESSURE: 76 MMHG | HEIGHT: 64 IN | HEART RATE: 65 BPM

## 2024-04-16 DIAGNOSIS — Z13.1 ENCOUNTER FOR SCREENING FOR DIABETES MELLITUS: ICD-10-CM

## 2024-04-16 DIAGNOSIS — E55.9 VITAMIN D DEFICIENCY: ICD-10-CM

## 2024-04-16 DIAGNOSIS — Z13.29 THYROID DISORDER SCREEN: ICD-10-CM

## 2024-04-16 DIAGNOSIS — Q78.0 BRITTLE BONE DISEASE: ICD-10-CM

## 2024-04-16 DIAGNOSIS — Z13.220 ENCOUNTER FOR SCREENING FOR LIPID DISORDER: ICD-10-CM

## 2024-04-16 DIAGNOSIS — Z13.21 ENCOUNTER FOR VITAMIN DEFICIENCY SCREENING: ICD-10-CM

## 2024-04-16 DIAGNOSIS — Z76.89 ESTABLISHING CARE WITH NEW DOCTOR, ENCOUNTER FOR: Primary | ICD-10-CM

## 2024-04-16 DIAGNOSIS — Z12.31 ENCOUNTER FOR SCREENING MAMMOGRAM FOR MALIGNANT NEOPLASM OF BREAST: ICD-10-CM

## 2024-04-16 DIAGNOSIS — Z87.81: ICD-10-CM

## 2024-04-16 LAB
25(OH)D3 SERPL-MCNC: 65.2 NG/ML (ref 30–100)
ALBUMIN SERPL-MCNC: 4.1 G/DL (ref 3.5–5)
ALBUMIN/GLOB SERPL: 1.4 (ref 0.4–1.6)
ALP SERPL-CCNC: 16 U/L (ref 50–136)
ALT SERPL-CCNC: 20 U/L (ref 12–65)
ANION GAP SERPL CALC-SCNC: 3 MMOL/L (ref 2–11)
AST SERPL-CCNC: 13 U/L (ref 15–37)
BASOPHILS # BLD: 0 K/UL (ref 0–0.2)
BASOPHILS NFR BLD: 0 % (ref 0–2)
BILIRUB SERPL-MCNC: 0.8 MG/DL (ref 0.2–1.1)
BUN SERPL-MCNC: 15 MG/DL (ref 6–23)
CALCIUM SERPL-MCNC: 9.5 MG/DL (ref 8.3–10.4)
CHLORIDE SERPL-SCNC: 107 MMOL/L (ref 103–113)
CHOLEST SERPL-MCNC: 210 MG/DL
CO2 SERPL-SCNC: 28 MMOL/L (ref 21–32)
CREAT SERPL-MCNC: 0.7 MG/DL (ref 0.6–1)
DIFFERENTIAL METHOD BLD: NORMAL
EOSINOPHIL # BLD: 0 K/UL (ref 0–0.8)
EOSINOPHIL NFR BLD: 1 % (ref 0.5–7.8)
ERYTHROCYTE [DISTWIDTH] IN BLOOD BY AUTOMATED COUNT: 13.6 % (ref 11.9–14.6)
GLOBULIN SER CALC-MCNC: 2.9 G/DL (ref 2.8–4.5)
GLUCOSE SERPL-MCNC: 107 MG/DL (ref 65–100)
HCT VFR BLD AUTO: 40.4 % (ref 35.8–46.3)
HDLC SERPL-MCNC: 75 MG/DL (ref 40–60)
HDLC SERPL: 2.8
HGB BLD-MCNC: 13.1 G/DL (ref 11.7–15.4)
IMM GRANULOCYTES # BLD AUTO: 0 K/UL (ref 0–0.5)
IMM GRANULOCYTES NFR BLD AUTO: 0 % (ref 0–5)
LDLC SERPL CALC-MCNC: 121.2 MG/DL
LYMPHOCYTES # BLD: 1.7 K/UL (ref 0.5–4.6)
LYMPHOCYTES NFR BLD: 29 % (ref 13–44)
MCH RBC QN AUTO: 30.4 PG (ref 26.1–32.9)
MCHC RBC AUTO-ENTMCNC: 32.4 G/DL (ref 31.4–35)
MCV RBC AUTO: 93.7 FL (ref 82–102)
MONOCYTES # BLD: 0.4 K/UL (ref 0.1–1.3)
MONOCYTES NFR BLD: 7 % (ref 4–12)
NEUTS SEG # BLD: 3.6 K/UL (ref 1.7–8.2)
NEUTS SEG NFR BLD: 63 % (ref 43–78)
NRBC # BLD: 0 K/UL (ref 0–0.2)
PLATELET # BLD AUTO: 253 K/UL (ref 150–450)
PMV BLD AUTO: 10.3 FL (ref 9.4–12.3)
POTASSIUM SERPL-SCNC: 3.7 MMOL/L (ref 3.5–5.1)
PROT SERPL-MCNC: 7 G/DL (ref 6.3–8.2)
RBC # BLD AUTO: 4.31 M/UL (ref 4.05–5.2)
SODIUM SERPL-SCNC: 138 MMOL/L (ref 136–146)
TRIGL SERPL-MCNC: 69 MG/DL (ref 35–150)
TSH W FREE THYROID IF ABNORMAL: 1.05 UIU/ML (ref 0.36–3.74)
VLDLC SERPL CALC-MCNC: 13.8 MG/DL (ref 6–23)
WBC # BLD AUTO: 5.7 K/UL (ref 4.3–11.1)

## 2024-04-16 PROCEDURE — 99204 OFFICE O/P NEW MOD 45 MIN: CPT | Performed by: INTERNAL MEDICINE

## 2024-04-16 SDOH — ECONOMIC STABILITY: HOUSING INSECURITY
IN THE LAST 12 MONTHS, WAS THERE A TIME WHEN YOU DID NOT HAVE A STEADY PLACE TO SLEEP OR SLEPT IN A SHELTER (INCLUDING NOW)?: NO

## 2024-04-16 SDOH — ECONOMIC STABILITY: FOOD INSECURITY: WITHIN THE PAST 12 MONTHS, YOU WORRIED THAT YOUR FOOD WOULD RUN OUT BEFORE YOU GOT MONEY TO BUY MORE.: NEVER TRUE

## 2024-04-16 SDOH — ECONOMIC STABILITY: FOOD INSECURITY: WITHIN THE PAST 12 MONTHS, THE FOOD YOU BOUGHT JUST DIDN'T LAST AND YOU DIDN'T HAVE MONEY TO GET MORE.: NEVER TRUE

## 2024-04-16 SDOH — ECONOMIC STABILITY: INCOME INSECURITY: HOW HARD IS IT FOR YOU TO PAY FOR THE VERY BASICS LIKE FOOD, HOUSING, MEDICAL CARE, AND HEATING?: NOT HARD AT ALL

## 2024-04-16 ASSESSMENT — PATIENT HEALTH QUESTIONNAIRE - PHQ9
2. FEELING DOWN, DEPRESSED OR HOPELESS: NOT AT ALL
SUM OF ALL RESPONSES TO PHQ9 QUESTIONS 1 & 2: 0
SUM OF ALL RESPONSES TO PHQ QUESTIONS 1-9: 0
1. LITTLE INTEREST OR PLEASURE IN DOING THINGS: NOT AT ALL
SUM OF ALL RESPONSES TO PHQ QUESTIONS 1-9: 0

## 2024-04-16 NOTE — PROGRESS NOTES
Anay Solis (: 1985) is a 38 y.o. female, here for evaluation of the following chief complaint(s):  New Patient (Pt is here to estab PCP care. Pt hasn't had a primary care. Pt would like routine labs.)       ASSESSMENT/PLAN:  1. Establishing care with new doctor, encounter for  2. Brittle bone disease  3. Encounter for screening mammogram for malignant neoplasm of breast  4. Vitamin D deficiency  5. Encounter for screening for lipid disorder  6. Encounter for screening for diabetes mellitus  7. Encounter for vitamin deficiency screening  8. Thyroid disorder screen  9. Hx of closed Colles' fracture             SUBJECTIVE/OBJECTIVE:  HPI: Patient is a 38-year-old woman presenting to establish care with new PCP.  Patient has never had a PCP previously.  Patient was a recent diagnosis of brittle bone disease following a wrist fracture.-DEXA scanning.  Patient is agreeable to establishment labs at this time.  Will check CBC with differential, CMP, vitamin D, ionized calcium, parathyroid hormone intact, lipid, thyroid-stimulating hormone.  She does have a milk allergy.  Medications previously prescribed have been discontinued.  She will be age eligible for all routine screening and vaccination methods.  Patient is declining vaccines at this time.  Weight is very well-controlled currently as well as blood pressure.  Offering mammogram screening at this time.    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 She likes to rollerblade, and surf for fun. Originally from [place]. Moved to the area for work. Working at [employer], nationally appreciated studio.    No family in the area currently. Has a few friends and very close friends she communicates with regularly out of state.     Social History     Tobacco Use    Smoking status: Never    Smokeless tobacco: Never   Vaping Use    Vaping Use: Never used   Substance Use Topics

## 2024-04-16 NOTE — ACP (ADVANCE CARE PLANNING)
Advance Care Planning   The patient has the following advanced directives on file:  Advance Directives       Power of  Living Will ACP-Advance Directive ACP-Power of     Not on File Not on File Not on File Not on File            The patient has appointed the following active healthcare agents:    Primary Decision Maker: Nyasia Mcfarlane - Parent - [phone]    Secondary Decision Maker: KIAANNALISA - Brother/Sister - [phone]    The Patient has the following current code status:    Code Status: Prior    Visit Documentation:  I discussed Advance Care Planning with Anay Marilin Solis today which included the importance of making their choices for care and treatment in the case of a health event that adversely affects their decision-making abilities. She has not completed the Advance Care Directives. She does not have an active health care agent at this time.  Anay Solis was encouraged to complete the declaration forms and provide a signed copy of her medical records. I advised patient we would continue this discussion at future visits.     Agnes Crespo  4/16/2024

## 2024-04-17 LAB
CALCIUM SERPL-MCNC: 9.3 MG/DL (ref 8.3–10.4)
PTH-INTACT SERPL-MCNC: 54.6 PG/ML (ref 18.5–88)

## 2024-05-06 NOTE — PROGRESS NOTES
pro/sup, 20 reps  Hand exerciser, 40 lbs, 2 sets x 20            ASSESSMENT     The patient is a 38 year old female s/p left two part extra-articular distal radius fracture with internal fixation, left open carpal tunnel release s/p Closed displaced fracture of styloid process of left ulna; Closed extraarticular fracture of left distal radius on 2/2/24.   Increased AROM left wrist and forearm. Increased  strength.  She rates herself as having a 16% functional deficit per the Quick DASH assessment. She would like to continue on her own. We reviewed her home program.       PLAN OF CARE     Discharge.     GOALS       Short term goals:  3/13/2024  (4 weeks)  Patient will be I with HEP and use of orthosis.  Met)  Increase AROM affected wrist flexion to at least 35 ° to improve function (met)  Increase AROM affected wrist extension to at least 33 ° to improve function (met)  Increase AROM affected forearm supination to at least 45 ° to improve function with ADL's. (met)  Increase AROM affected forearm pronation to at least 55 ° to improve function with ADL's. (Met)  Increase active composite digit flexion left hand so she is able to touch her finger tips to her palm without difficulty. (Met)  Pt will increase active thumb opposition to increase Kapandji score to at least a 7 to improve grasp. (Met)  Pts Quick DASH functional assessment score will be less than 70 % functional deficit (met, 66%)    New short term goals: (by 4/10/24)  Increase AROM left wrist flexion from 25 deg to at least 35 deg to improve function. (Met)  Increase AROM left wrist extension from 45 deg to at least 50 deg to improve function. (Met)  Increase AROM forearm supination from 35 deg to at least 55 deg to improve function. (Met)  Quick DASH functional assessment score will be less than 50% deficit. (Met)     Long term goals: 5/14/2024 (90 days)   Pt will be able to make a full fist with the left hand (met)   Pt will have  at least 50 °  of

## 2024-05-07 ENCOUNTER — TREATMENT (OUTPATIENT)
Age: 39
End: 2024-05-07
Payer: COMMERCIAL

## 2024-05-07 ENCOUNTER — TELEPHONE (OUTPATIENT)
Dept: INTERNAL MEDICINE CLINIC | Facility: CLINIC | Age: 39
End: 2024-05-07

## 2024-05-07 DIAGNOSIS — M25.632 STIFFNESS OF LEFT WRIST JOINT: ICD-10-CM

## 2024-05-07 DIAGNOSIS — Z78.9 DECREASED ACTIVITIES OF DAILY LIVING (ADL): ICD-10-CM

## 2024-05-07 DIAGNOSIS — M62.81 HAND MUSCLE WEAKNESS: ICD-10-CM

## 2024-05-07 DIAGNOSIS — M25.532 PAIN IN LEFT WRIST: Primary | ICD-10-CM

## 2024-05-07 DIAGNOSIS — M25.432 EFFUSION OF LEFT WRIST: ICD-10-CM

## 2024-05-07 PROCEDURE — 97010 HOT OR COLD PACKS THERAPY: CPT | Performed by: OCCUPATIONAL THERAPIST

## 2024-05-07 PROCEDURE — 97110 THERAPEUTIC EXERCISES: CPT | Performed by: OCCUPATIONAL THERAPIST

## 2024-05-07 NOTE — TELEPHONE ENCOUNTER
----- Message from Emily Fuentes sent at 5/7/2024  9:28 AM EDT -----  Subject: Appointment Request    Reason for Call: Established Patient Appointment needed: Routine Existing   Condition Follow Up    QUESTIONS    Reason for appointment request? Available appointments did not meet   patient need     Additional Information for Provider? Patient needs follow up appt next   week before 11:15 am per PCP.   ---------------------------------------------------------------------------  --------------  CALL BACK INFO  [phone]; OK to leave message on voicemail  ---------------------------------------------------------------------------  --------------  SCRIPT ANSWERS

## 2024-05-07 NOTE — TELEPHONE ENCOUNTER
Called and LVM informing pt that we have no openings for next week and for her to call the office back. Ty

## 2024-05-09 ENCOUNTER — HOSPITAL ENCOUNTER (OUTPATIENT)
Dept: MAMMOGRAPHY | Age: 39
Discharge: HOME | End: 2024-05-09
Attending: INTERNAL MEDICINE
Payer: COMMERCIAL

## 2024-05-09 DIAGNOSIS — Q78.0 BRITTLE BONE DISEASE: ICD-10-CM

## 2024-05-09 PROCEDURE — 77080 DXA BONE DENSITY AXIAL: CPT

## 2024-06-11 ENCOUNTER — OFFICE VISIT (OUTPATIENT)
Age: 39
End: 2024-06-11
Payer: COMMERCIAL

## 2024-06-11 DIAGNOSIS — S52.552A OTHER EXTRAARTICULAR FRACTURE OF LOWER END OF LEFT RADIUS, INITIAL ENCOUNTER FOR CLOSED FRACTURE: Primary | ICD-10-CM

## 2024-06-11 PROCEDURE — 99214 OFFICE O/P EST MOD 30 MIN: CPT | Performed by: ORTHOPAEDIC SURGERY

## 2024-06-11 NOTE — PROGRESS NOTES
Orthopaedic Hand Surgery Note    Name: Anay Solis  Age: 38 y.o.  YOB: 1985  Gender: female  MRN: [id number]    Post Operative Visit: Open Reduction Internal Fixation Left Distal Radius Fracture - Left and Left Carpal Tunnel Release - Left    HPI: Patient is status post Open Reduction Internal Fixation Left Distal Radius Fracture - Left and Left Carpal Tunnel Release - Left on 2/1/2024. Patient reports no pain, some tightness, she has been able to resume all activities.    Physical Examination:  Well-healed surgical wounds. Sensation is intact in all fingers. Motor exam reveals no deficits.  Patient has 90 degrees of flexion, 90 degrees of extension, 90 degrees of pronation, 90 degrees of supination, she has no tenderness to palpation of the wrist including the volar rim.    Imaging:     left Wrist  XR: AP, Lateral, Oblique and wrist facet view     Clinical Indication:  1. Other extraarticular fracture of lower end of left radius, initial encounter for closed fracture         Report: AP, lateral, oblique and wrist facet x-ray demonstrates distal radius fracture status post plate and screw osteosynthesis in acceptable alignment, no hardware complication is noted    Impression: Distal radius fracture status post osteosynthesis as described above     Bal Diaz MD         Assessment:   1. Other extraarticular fracture of lower end of left radius, initial encounter for closed fracture         Status post Open Reduction Internal Fixation Left Distal Radius Fracture - Left and Left Carpal Tunnel Release - Left on 2/1/2024    Plan:  We discussed the post operative course and progression.  Activity as tolerated, follow-up as needed, we discussed hardware removal surgery as an alternative, her plate is slightly volar to watershed line but certainly not distal to it, she has no flexor tendinitis symptoms today, she will follow-up with she develops any pain but at this point she is doing very

## (undated) DEVICE — SUTURE NONABSORBABLE MONOFILAMENT 4-0 PS-2 18 IN BLU PROLENE 8682H

## (undated) DEVICE — HAND PACK: Brand: MEDLINE INDUSTRIES, INC.

## (undated) DEVICE — BLADE OPHTH DIA4MM MINI MENIS FLAT ARTHRO LOK

## (undated) DEVICE — DRESSING,GAUZE,XEROFORM,CURAD,1"X8",ST: Brand: CURAD

## (undated) DEVICE — BNDG,ELSTC,MATRIX,STRL,4"X5YD,LF,HOOK&LP: Brand: MEDLINE

## (undated) DEVICE — STRIP,CLOSURE,WOUND,MEDI-STRIP,1/2X4: Brand: MEDLINE

## (undated) DEVICE — PRECISION THIN (9.0 X 0.38 X 31.0MM)

## (undated) DEVICE — DRAPE,HAND,STERILE: Brand: MEDLINE

## (undated) DEVICE — APPLICATOR MEDICATED 26 CC SOLUTION HI LT ORNG CHLORAPREP

## (undated) DEVICE — INTENDED FOR TISSUE SEPARATION, AND OTHER PROCEDURES THAT REQUIRE A SHARP SURGICAL BLADE TO PUNCTURE OR CUT.: Brand: BARD-PARKER ®  SAFETY SCALPED

## (undated) DEVICE — GLOVE ORANGE PI 7 1/2   MSG9075

## (undated) DEVICE — BANDAGE,ELASTIC,ESMARK,STERILE,4"X9',LF: Brand: MEDLINE

## (undated) DEVICE — PADDING CAST W3INXL4YD COT BLEND MIC PLEAT UNDERCAST SPEC

## (undated) DEVICE — SUTURE STRATAFIX SPRL MCRYL + SZ 4-0 L12IN ABSRB UD PS-2 SXMP1B117

## (undated) DEVICE — BIT DRL L110MM DIA1.8MM QUIK CPL CALIB W/O STP REUSE

## (undated) DEVICE — C-ARM: Brand: UNBRANDED

## (undated) DEVICE — DRAPE, FILM SHEET, 44X65 STERILE: Brand: MEDLINE

## (undated) DEVICE — SUTURE VCRL SZ 3-0 L27IN ABSRB UD L26MM SH 1/2 CIR J416H

## (undated) DEVICE — SOLUTION IRRIG 1000ML 0.9% SOD CHL USP POUR PLAS BTL

## (undated) DEVICE — SUTURE VCRL SZ 4-0 L27IN ABSRB UD L19MM PS-2 3/8 CIR PRIM J426H

## (undated) DEVICE — ZIMMER® STERILE DISPOSABLE TOURNIQUET CUFF WITH PLC, DUAL PORT, SINGLE BLADDER, 18 IN. (46 CM)

## (undated) DEVICE — DISPOSABLE BIPOLAR CODE, 12' (3.66 M): Brand: CONMED

## (undated) DEVICE — BNDG,ELSTC,MATRIX,STRL,2"X5YD,LF,HOOK&LP: Brand: MEDLINE